# Patient Record
Sex: MALE | Race: WHITE | Employment: FULL TIME | URBAN - METROPOLITAN AREA
[De-identification: names, ages, dates, MRNs, and addresses within clinical notes are randomized per-mention and may not be internally consistent; named-entity substitution may affect disease eponyms.]

---

## 2017-04-04 ENCOUNTER — OFFICE VISIT (OUTPATIENT)
Dept: FAMILY MEDICINE CLINIC | Age: 27
End: 2017-04-04
Payer: COMMERCIAL

## 2017-04-04 VITALS
DIASTOLIC BLOOD PRESSURE: 90 MMHG | SYSTOLIC BLOOD PRESSURE: 120 MMHG | BODY MASS INDEX: 32.28 KG/M2 | WEIGHT: 197 LBS | TEMPERATURE: 98.1 F

## 2017-04-04 DIAGNOSIS — J01.00 ACUTE NON-RECURRENT MAXILLARY SINUSITIS: Primary | ICD-10-CM

## 2017-04-04 PROCEDURE — 99213 OFFICE O/P EST LOW 20 MIN: CPT | Performed by: FAMILY MEDICINE

## 2017-04-04 RX ORDER — AMOXICILLIN AND CLAVULANATE POTASSIUM 875; 125 MG/1; MG/1
1 TABLET, FILM COATED ORAL 2 TIMES DAILY
Qty: 20 TABLET | Refills: 0 | Status: SHIPPED | OUTPATIENT
Start: 2017-04-04 | End: 2017-04-14

## 2017-04-04 ASSESSMENT — ENCOUNTER SYMPTOMS
EYE REDNESS: 0
RHINORRHEA: 1
SORE THROAT: 1
EYE DISCHARGE: 0
COUGH: 1
SHORTNESS OF BREATH: 0

## 2017-08-01 ENCOUNTER — HOSPITAL ENCOUNTER (OUTPATIENT)
Age: 27
Discharge: HOME | End: 2017-08-01
Payer: COMMERCIAL

## 2017-08-01 ENCOUNTER — OFFICE VISIT (OUTPATIENT)
Dept: FAMILY MEDICINE CLINIC | Age: 27
End: 2017-08-01
Payer: COMMERCIAL

## 2017-08-01 VITALS
DIASTOLIC BLOOD PRESSURE: 80 MMHG | HEIGHT: 67 IN | BODY MASS INDEX: 31.08 KG/M2 | WEIGHT: 198 LBS | SYSTOLIC BLOOD PRESSURE: 130 MMHG

## 2017-08-01 DIAGNOSIS — R73.9 HYPERGLYCEMIA: ICD-10-CM

## 2017-08-01 DIAGNOSIS — R73.9 HYPERGLYCEMIA: Primary | ICD-10-CM

## 2017-08-01 LAB
CREATININE URINE: 186.3 MG/DL (ref 39–259)
ESTIMATED AVERAGE GLUCOSE: 255 MG/DL
GLUCOSE FASTING: 241 MG/DL (ref 70–99)
HBA1C MFR BLD: 10.5 % (ref 4.8–5.9)
MICROALBUMIN/CREAT 24H UR: 98 MG/L
MICROALBUMIN/CREAT UR-RTO: 53 MCG/MG CREAT
PATIENT FASTING?: YES

## 2017-08-01 PROCEDURE — 83036 HEMOGLOBIN GLYCOSYLATED A1C: CPT

## 2017-08-01 PROCEDURE — 82570 ASSAY OF URINE CREATININE: CPT

## 2017-08-01 PROCEDURE — 82043 UR ALBUMIN QUANTITATIVE: CPT

## 2017-08-01 PROCEDURE — 36415 COLL VENOUS BLD VENIPUNCTURE: CPT

## 2017-08-01 PROCEDURE — 99213 OFFICE O/P EST LOW 20 MIN: CPT | Performed by: FAMILY MEDICINE

## 2017-08-01 PROCEDURE — 82947 ASSAY GLUCOSE BLOOD QUANT: CPT

## 2017-08-01 ASSESSMENT — ENCOUNTER SYMPTOMS
EYE DISCHARGE: 0
SHORTNESS OF BREATH: 0
FACIAL SWELLING: 0
NAUSEA: 0
VOMITING: 0
COUGH: 0
DIARRHEA: 0
EYE REDNESS: 0

## 2017-09-27 ENCOUNTER — OFFICE VISIT (OUTPATIENT)
Dept: FAMILY MEDICINE CLINIC | Age: 27
End: 2017-09-27
Payer: COMMERCIAL

## 2017-09-27 VITALS
DIASTOLIC BLOOD PRESSURE: 80 MMHG | TEMPERATURE: 98.2 F | SYSTOLIC BLOOD PRESSURE: 132 MMHG | HEART RATE: 88 BPM | OXYGEN SATURATION: 98 % | HEIGHT: 66 IN | WEIGHT: 198 LBS | BODY MASS INDEX: 31.82 KG/M2

## 2017-09-27 DIAGNOSIS — Z02.89 ENCOUNTER FOR PHYSICAL EXAMINATION RELATED TO EMPLOYMENT: Primary | ICD-10-CM

## 2017-09-27 PROCEDURE — 99395 PREV VISIT EST AGE 18-39: CPT | Performed by: NURSE PRACTITIONER

## 2017-09-28 ASSESSMENT — ENCOUNTER SYMPTOMS
DIARRHEA: 0
BLURRED VISION: 0
CONSTIPATION: 0
NAUSEA: 0
SHORTNESS OF BREATH: 0
DOUBLE VISION: 0
BACK PAIN: 0
COUGH: 0
BLOOD IN STOOL: 0
HEARTBURN: 0
ORTHOPNEA: 0
SORE THROAT: 0
VOMITING: 0
ABDOMINAL PAIN: 0

## 2017-10-17 ENCOUNTER — OFFICE VISIT (OUTPATIENT)
Dept: FAMILY MEDICINE CLINIC | Age: 27
End: 2017-10-17
Payer: COMMERCIAL

## 2017-10-17 VITALS
SYSTOLIC BLOOD PRESSURE: 136 MMHG | BODY MASS INDEX: 32.12 KG/M2 | TEMPERATURE: 97.8 F | DIASTOLIC BLOOD PRESSURE: 80 MMHG | WEIGHT: 199 LBS

## 2017-10-17 DIAGNOSIS — L60.0 INGROWN LEFT BIG TOENAIL: Primary | ICD-10-CM

## 2017-10-17 PROCEDURE — 99213 OFFICE O/P EST LOW 20 MIN: CPT | Performed by: FAMILY MEDICINE

## 2017-10-17 RX ORDER — CEPHALEXIN 500 MG/1
500 CAPSULE ORAL 2 TIMES DAILY
Qty: 6 CAPSULE | Refills: 0 | Status: SHIPPED | OUTPATIENT
Start: 2017-10-17 | End: 2018-02-14 | Stop reason: ALTCHOICE

## 2017-10-17 ASSESSMENT — ENCOUNTER SYMPTOMS
VOMITING: 0
NAUSEA: 0
DIARRHEA: 0

## 2017-10-17 NOTE — PATIENT INSTRUCTIONS
SURVEY:    You may be receiving a survey from [employer] regarding your visit today. Please complete the survey to enable us to provide the highest quality of care to you and your family. If you cannot score us a very good on any question, please call the office to discuss how we could of made your experience a very good one. Thank you.

## 2017-10-24 ENCOUNTER — OFFICE VISIT (OUTPATIENT)
Dept: FAMILY MEDICINE CLINIC | Age: 27
End: 2017-10-24
Payer: COMMERCIAL

## 2017-10-24 VITALS — WEIGHT: 197 LBS | BODY MASS INDEX: 31.8 KG/M2 | SYSTOLIC BLOOD PRESSURE: 130 MMHG | DIASTOLIC BLOOD PRESSURE: 60 MMHG

## 2017-10-24 DIAGNOSIS — E11.9 TYPE 2 DIABETES MELLITUS WITHOUT COMPLICATION, WITHOUT LONG-TERM CURRENT USE OF INSULIN (HCC): Primary | ICD-10-CM

## 2017-10-24 LAB — HBA1C MFR BLD: 9 %

## 2017-10-24 PROCEDURE — 99213 OFFICE O/P EST LOW 20 MIN: CPT | Performed by: FAMILY MEDICINE

## 2017-10-24 PROCEDURE — 83036 HEMOGLOBIN GLYCOSYLATED A1C: CPT | Performed by: FAMILY MEDICINE

## 2017-10-24 RX ORDER — GLUCOSAMINE HCL/CHONDROITIN SU 500-400 MG
CAPSULE ORAL
Qty: 100 STRIP | Refills: 1 | Status: SHIPPED | OUTPATIENT
Start: 2017-10-24

## 2017-10-24 RX ORDER — DIPHENHYDRAMINE HYDROCHLORIDE 25 MG/1
CAPSULE, LIQUID FILLED ORAL
Qty: 1 KIT | Refills: 0 | Status: SHIPPED | OUTPATIENT
Start: 2017-10-24

## 2017-10-24 RX ORDER — LISINOPRIL 5 MG/1
5 TABLET ORAL DAILY
Qty: 30 TABLET | Refills: 3 | Status: SHIPPED | OUTPATIENT
Start: 2017-10-24 | End: 2018-02-08 | Stop reason: SDUPTHER

## 2017-10-24 RX ORDER — LANCETS 30 GAUGE
EACH MISCELLANEOUS
Qty: 100 EACH | Refills: 3 | Status: SHIPPED | OUTPATIENT
Start: 2017-10-24

## 2017-10-24 ASSESSMENT — ENCOUNTER SYMPTOMS
EYE DISCHARGE: 0
EYE REDNESS: 0
DIARRHEA: 0
TROUBLE SWALLOWING: 0
COUGH: 0
FACIAL SWELLING: 0
VOMITING: 0
SHORTNESS OF BREATH: 0
NAUSEA: 0
ABDOMINAL PAIN: 0

## 2017-10-24 NOTE — PROGRESS NOTES
Neck: Neck supple. No thyromegaly present. Cardiovascular: Normal rate, regular rhythm and normal heart sounds. No murmur heard. Pulmonary/Chest: Effort normal and breath sounds normal. No respiratory distress. He has no wheezes. Abdominal: Soft. Bowel sounds are normal. He exhibits no distension. There is no tenderness. Musculoskeletal: He exhibits no edema. Lymphadenopathy:     He has no cervical adenopathy. Neurological: He is alert and oriented to person, place, and time. Skin: No rash noted. No erythema. Gross sensation intact and no lesions or sores on feet bilateral. +2 DP pulse bilateral.     Psychiatric: He has a normal mood and affect. Vitals reviewed. Vitals:  /60   Wt 197 lb (89.4 kg)   BMI 31.80 kg/m²       Data:     Lab Results   Component Value Date    GLUCOSE 265 10/20/2016     No results found for: WBC, RBC, HGB, HCT, MCV, MCH, MCHC, RDW, PLT, MPV  No results found for: TSH  Lab Results   Component Value Date    CHOL 122 10/20/2016    HDL 27 10/20/2016    LABA1C 9.0 10/24/2017          Assessment/Plan:       1. Type 2 diabetes mellitus without complication, without long-term current use of insulin ([facility] [address]  Had long d/w pt about DM diet --- low carb and sugars, drink more water and exercise at least 3d per week. Pt started on Januvia 50mg daily and glucophage 500mg BID and lisinopril 5mg daily. Pt to have labs in 3mos before f/u  All questions answered  - POCT glycosylated hemoglobin (Hb A1C)  - Lipid Panel; Future  - Hemoglobin A1C; Future  - Comprehensive Metabolic Panel;  Future        Electronically signed by Lencho Chung MD on 10/24/2017 at 11:31 AM

## 2017-10-25 ENCOUNTER — TELEPHONE (OUTPATIENT)
Dept: FAMILY MEDICINE CLINIC | Age: 27
End: 2017-10-25

## 2017-10-25 RX ORDER — GLIPIZIDE 5 MG/1
5 TABLET ORAL 2 TIMES DAILY
Qty: 60 TABLET | Refills: 2 | Status: SHIPPED | OUTPATIENT
Start: 2017-10-25 | End: 2018-02-08 | Stop reason: SDUPTHER

## 2017-10-25 NOTE — TELEPHONE ENCOUNTER
Patient called in stating Radha Jiang is over 400 dollars and he does not want to pay this    Anything else we can send for him    Health Maintenance   Topic Date Due    Diabetic foot exam  09/26/1998    Diabetic retinal exam  09/26/1998    HIV screen  09/26/2003    DTaP/Tdap/Td vaccine (1 - Tdap) 09/26/2007    Pneumococcal med risk (1 of 1 - PPSV23) 09/26/2007    Flu vaccine (1) 09/01/2017    Lipid screen  10/20/2017    Diabetic hemoglobin A1C test  01/24/2018    Diabetic microalbuminuria test  08/01/2018             (applicable per patient's age: Cancer Screenings, Depression Screening, Fall Risk Screening, Immunizations)    Hemoglobin A1C (%)   Date Value   10/24/2017 9.0   08/01/2017 10.5 (H)     Microalb/Crt. Ratio (mcg/mg creat)   Date Value   08/01/2017 53 (H)     LDL Cholesterol (mg/dL)   Date Value   10/20/2016 51      (goal A1C is < 7)   (goal LDL is <100) need 30-50% reduction from baseline     BP Readings from Last 3 Encounters:   10/24/17 130/60   10/17/17 136/80   09/27/17 132/80    (goal /80)      All Future Testing planned in [facility]:  Lab Frequency Next Occurrence   Lipid Panel Once 01/24/2018   Hemoglobin A1C Once 01/24/2018   Comprehensive Metabolic Panel Once 54/76/4678       Next Visit Date:  No future appointments.          Patient Active Problem List:     Attention deficit disorder

## 2018-02-08 RX ORDER — GLIPIZIDE 5 MG/1
5 TABLET ORAL 2 TIMES DAILY
Qty: 60 TABLET | Refills: 1 | Status: SHIPPED | OUTPATIENT
Start: 2018-02-08 | End: 2018-03-29 | Stop reason: SDUPTHER

## 2018-02-08 RX ORDER — LISINOPRIL 5 MG/1
5 TABLET ORAL DAILY
Qty: 30 TABLET | Refills: 1 | Status: SHIPPED | OUTPATIENT
Start: 2018-02-08 | End: 2018-03-29 | Stop reason: SDUPTHER

## 2018-02-08 NOTE — TELEPHONE ENCOUNTER
Requesting a refill on Lisinopril 5 mg, Metformin 500 mg and Glipizide. Patient states he will call back to schedule an appointment. He was last seen 10/24/17. Drug [address] Maintenance   Topic Date Due    Potassium monitoring  1990    Creatinine monitoring  1990    Diabetic foot exam  09/26/1998    Diabetic retinal exam  09/26/1998    HIV screen  09/26/2003    DTaP/Tdap/Td vaccine (1 - Tdap) 09/26/2007    Pneumococcal med risk (1 of 1 - PPSV23) 09/26/2007    Flu vaccine (1) 09/01/2017    Lipid screen  10/20/2017    A1C test (Diabetic or Prediabetic)  01/24/2018    Diabetic microalbuminuria test  08/01/2018             (applicable per patient's age: Cancer Screenings, Depression Screening, Fall Risk Screening, Immunizations)    Hemoglobin A1C (%)   Date Value   10/24/2017 9.0   08/01/2017 10.5 (H)     Microalb/Crt. Ratio (mcg/mg creat)   Date Value   08/01/2017 53 (H)     LDL Cholesterol (mg/dL)   Date Value   10/20/2016 51      (goal A1C is < 7)   (goal LDL is <100) need 30-50% reduction from baseline     BP Readings from Last 3 Encounters:   10/24/17 130/60   10/17/17 136/80   09/27/17 132/80    (goal /80)      All Future Testing planned in [facility]:  Lab Frequency Next Occurrence   Lipid Panel Once 10/23/2018   Hemoglobin A1C Once 10/23/2018   Comprehensive Metabolic Panel Once 67/38/7650       Next Visit Date:  No future appointments.          Patient Active Problem List:     Attention deficit disorder

## 2018-02-13 ENCOUNTER — HOSPITAL ENCOUNTER (OUTPATIENT)
Age: 28
Discharge: HOME | End: 2018-02-13
Payer: COMMERCIAL

## 2018-02-13 DIAGNOSIS — E11.9 TYPE 2 DIABETES MELLITUS WITHOUT COMPLICATION, WITHOUT LONG-TERM CURRENT USE OF INSULIN (HCC): ICD-10-CM

## 2018-02-13 LAB
ALBUMIN SERPL-MCNC: 4.6 G/DL (ref 3.5–5.2)
ALBUMIN/GLOBULIN RATIO: ABNORMAL (ref 1–2.5)
ALP BLD-CCNC: 101 U/L (ref 40–129)
ALT SERPL-CCNC: 84 U/L (ref 5–41)
ANION GAP SERPL CALCULATED.3IONS-SCNC: 13 MMOL/L (ref 9–17)
AST SERPL-CCNC: 39 U/L
BILIRUB SERPL-MCNC: 0.31 MG/DL (ref 0.3–1.2)
BUN BLDV-MCNC: 13 MG/DL (ref 6–20)
BUN/CREAT BLD: 13 (ref 9–20)
CALCIUM SERPL-MCNC: 9.8 MG/DL (ref 8.6–10.4)
CHLORIDE BLD-SCNC: 102 MMOL/L (ref 98–107)
CHOLESTEROL/HDL RATIO: 6.5
CHOLESTEROL: 144 MG/DL
CO2: 26 MMOL/L (ref 20–31)
CREAT SERPL-MCNC: 0.99 MG/DL (ref 0.7–1.2)
ESTIMATED AVERAGE GLUCOSE: 123 MG/DL
GFR AFRICAN AMERICAN: >60 ML/MIN
GFR NON-AFRICAN AMERICAN: >60 ML/MIN
GFR SERPL CREATININE-BSD FRML MDRD: ABNORMAL ML/MIN/{1.73_M2}
GFR SERPL CREATININE-BSD FRML MDRD: ABNORMAL ML/MIN/{1.73_M2}
GLUCOSE BLD-MCNC: 104 MG/DL (ref 70–99)
HBA1C MFR BLD: 5.9 % (ref 4.8–5.9)
HDLC SERPL-MCNC: 22 MG/DL
LDL CHOLESTEROL: 54 MG/DL (ref 0–130)
PATIENT FASTING?: YES
POTASSIUM SERPL-SCNC: 4 MMOL/L (ref 3.7–5.3)
SODIUM BLD-SCNC: 141 MMOL/L (ref 135–144)
TOTAL PROTEIN: 8.3 G/DL (ref 6.4–8.3)
TRIGL SERPL-MCNC: 339 MG/DL
VLDLC SERPL CALC-MCNC: ABNORMAL MG/DL (ref 1–30)

## 2018-02-13 PROCEDURE — 83036 HEMOGLOBIN GLYCOSYLATED A1C: CPT

## 2018-02-13 PROCEDURE — 80053 COMPREHEN METABOLIC PANEL: CPT

## 2018-02-13 PROCEDURE — 36415 COLL VENOUS BLD VENIPUNCTURE: CPT

## 2018-02-13 PROCEDURE — 80061 LIPID PANEL: CPT

## 2018-02-14 ENCOUNTER — OFFICE VISIT (OUTPATIENT)
Dept: FAMILY MEDICINE CLINIC | Age: 28
End: 2018-02-14
Payer: COMMERCIAL

## 2018-02-14 VITALS
TEMPERATURE: 99 F | BODY MASS INDEX: 31.96 KG/M2 | HEART RATE: 72 BPM | OXYGEN SATURATION: 98 % | DIASTOLIC BLOOD PRESSURE: 80 MMHG | SYSTOLIC BLOOD PRESSURE: 138 MMHG | WEIGHT: 198 LBS

## 2018-02-14 DIAGNOSIS — J20.8 ACUTE BRONCHITIS DUE TO OTHER SPECIFIED ORGANISMS: ICD-10-CM

## 2018-02-14 DIAGNOSIS — E11.9 TYPE 2 DIABETES MELLITUS WITHOUT COMPLICATION, WITHOUT LONG-TERM CURRENT USE OF INSULIN (HCC): Primary | ICD-10-CM

## 2018-02-14 PROCEDURE — 99213 OFFICE O/P EST LOW 20 MIN: CPT | Performed by: FAMILY MEDICINE

## 2018-02-14 RX ORDER — AZITHROMYCIN 250 MG/1
TABLET, FILM COATED ORAL
Qty: 1 PACKET | Refills: 0 | Status: SHIPPED | OUTPATIENT
Start: 2018-02-14 | End: 2018-08-06 | Stop reason: ALTCHOICE

## 2018-02-14 ASSESSMENT — PATIENT HEALTH QUESTIONNAIRE - PHQ9
2. FEELING DOWN, DEPRESSED OR HOPELESS: 0
1. LITTLE INTEREST OR PLEASURE IN DOING THINGS: 0
SUM OF ALL RESPONSES TO PHQ9 QUESTIONS 1 & 2: 0
SUM OF ALL RESPONSES TO PHQ QUESTIONS 1-9: 0

## 2018-02-14 ASSESSMENT — ENCOUNTER SYMPTOMS
WHEEZING: 1
SHORTNESS OF BREATH: 0
DIARRHEA: 0
HEMOPTYSIS: 0
EYE DISCHARGE: 0
CONSTIPATION: 0
COUGH: 1
EYE REDNESS: 0
SORE THROAT: 0
VOMITING: 0
ABDOMINAL PAIN: 0
HEARTBURN: 0
RHINORRHEA: 0

## 2018-02-14 NOTE — PATIENT INSTRUCTIONS
SURVEY:    You may be receiving a survey from [employer] regarding your visit today. Please complete the survey to enable us to provide the highest quality of care to you and your family. If you cannot score us a very good on any question, please call the office to discuss how we could of made your experience a very good one. Thank you. DATE: MEDICATIONS TAKEN TODAY? BLOOD PRESSURE READING: HEART RATE/PULSE: BLOOD SUGAR #/FASTING?  COMMENTS

## 2018-03-29 ENCOUNTER — TELEPHONE (OUTPATIENT)
Dept: FAMILY MEDICINE CLINIC | Age: 28
End: 2018-03-29

## 2018-03-31 RX ORDER — LISINOPRIL 5 MG/1
5 TABLET ORAL DAILY
Qty: 30 TABLET | Refills: 0 | Status: SHIPPED | OUTPATIENT
Start: 2018-03-31 | End: 2018-07-24 | Stop reason: SDUPTHER

## 2018-03-31 RX ORDER — GLIPIZIDE 5 MG/1
5 TABLET ORAL 2 TIMES DAILY
Qty: 60 TABLET | Refills: 2 | Status: SHIPPED | OUTPATIENT
Start: 2018-03-31 | End: 2018-07-24 | Stop reason: SDUPTHER

## 2018-07-25 RX ORDER — LISINOPRIL 5 MG/1
5 TABLET ORAL DAILY
Qty: 30 TABLET | Refills: 0 | Status: SHIPPED | OUTPATIENT
Start: 2018-07-25 | End: 2018-08-06 | Stop reason: SDUPTHER

## 2018-07-25 RX ORDER — GLIPIZIDE 5 MG/1
5 TABLET ORAL 2 TIMES DAILY
Qty: 60 TABLET | Refills: 0 | Status: SHIPPED | OUTPATIENT
Start: 2018-07-25 | End: 2018-08-06 | Stop reason: SDUPTHER

## 2018-08-03 ENCOUNTER — HOSPITAL ENCOUNTER (OUTPATIENT)
Age: 28
Discharge: HOME | End: 2018-08-03
Payer: COMMERCIAL

## 2018-08-03 DIAGNOSIS — E11.9 TYPE 2 DIABETES MELLITUS WITHOUT COMPLICATION, WITHOUT LONG-TERM CURRENT USE OF INSULIN (HCC): ICD-10-CM

## 2018-08-03 LAB
ALBUMIN SERPL-MCNC: 4.5 G/DL (ref 3.5–5.2)
ALBUMIN/GLOBULIN RATIO: ABNORMAL (ref 1–2.5)
ALP BLD-CCNC: 109 U/L (ref 40–129)
ALT SERPL-CCNC: 129 U/L (ref 5–41)
ANION GAP SERPL CALCULATED.3IONS-SCNC: 10 MMOL/L (ref 9–17)
AST SERPL-CCNC: 45 U/L
BILIRUB SERPL-MCNC: 0.82 MG/DL (ref 0.3–1.2)
BUN BLDV-MCNC: 12 MG/DL (ref 6–20)
BUN/CREAT BLD: 12 (ref 9–20)
CALCIUM SERPL-MCNC: 10 MG/DL (ref 8.6–10.4)
CHLORIDE BLD-SCNC: 100 MMOL/L (ref 98–107)
CHOLESTEROL/HDL RATIO: 4.7
CHOLESTEROL: 136 MG/DL
CO2: 27 MMOL/L (ref 20–31)
CREAT SERPL-MCNC: 1.01 MG/DL (ref 0.7–1.2)
CREATININE URINE: 285.9 MG/DL (ref 39–259)
ESTIMATED AVERAGE GLUCOSE: 151 MG/DL
GFR AFRICAN AMERICAN: >60 ML/MIN
GFR NON-AFRICAN AMERICAN: >60 ML/MIN
GFR SERPL CREATININE-BSD FRML MDRD: ABNORMAL ML/MIN/{1.73_M2}
GFR SERPL CREATININE-BSD FRML MDRD: ABNORMAL ML/MIN/{1.73_M2}
GLUCOSE BLD-MCNC: 176 MG/DL (ref 70–99)
HBA1C MFR BLD: 6.9 % (ref 4.8–5.9)
HDLC SERPL-MCNC: 29 MG/DL
LDL CHOLESTEROL: 61 MG/DL (ref 0–130)
MICROALBUMIN/CREAT 24H UR: 235 MG/L
MICROALBUMIN/CREAT UR-RTO: 82 MCG/MG CREAT
PATIENT FASTING?: YES
POTASSIUM SERPL-SCNC: 4.4 MMOL/L (ref 3.7–5.3)
SODIUM BLD-SCNC: 137 MMOL/L (ref 135–144)
TOTAL PROTEIN: 7.8 G/DL (ref 6.4–8.3)
TRIGL SERPL-MCNC: 231 MG/DL
VLDLC SERPL CALC-MCNC: ABNORMAL MG/DL (ref 1–30)

## 2018-08-03 PROCEDURE — 82043 UR ALBUMIN QUANTITATIVE: CPT

## 2018-08-03 PROCEDURE — 36415 COLL VENOUS BLD VENIPUNCTURE: CPT

## 2018-08-03 PROCEDURE — 83036 HEMOGLOBIN GLYCOSYLATED A1C: CPT

## 2018-08-03 PROCEDURE — 82570 ASSAY OF URINE CREATININE: CPT

## 2018-08-03 PROCEDURE — 80053 COMPREHEN METABOLIC PANEL: CPT

## 2018-08-03 PROCEDURE — 80061 LIPID PANEL: CPT

## 2018-08-06 ENCOUNTER — OFFICE VISIT (OUTPATIENT)
Dept: FAMILY MEDICINE CLINIC | Age: 28
End: 2018-08-06
Payer: COMMERCIAL

## 2018-08-06 VITALS
BODY MASS INDEX: 32.77 KG/M2 | WEIGHT: 203 LBS | DIASTOLIC BLOOD PRESSURE: 80 MMHG | OXYGEN SATURATION: 98 % | SYSTOLIC BLOOD PRESSURE: 136 MMHG | HEART RATE: 94 BPM

## 2018-08-06 DIAGNOSIS — E11.69 TYPE 2 DIABETES MELLITUS WITH OTHER SPECIFIED COMPLICATION, WITHOUT LONG-TERM CURRENT USE OF INSULIN (HCC): Primary | ICD-10-CM

## 2018-08-06 DIAGNOSIS — R79.89 ELEVATED LFTS: ICD-10-CM

## 2018-08-06 PROBLEM — E11.9 DIABETES MELLITUS (HCC): Status: ACTIVE | Noted: 2018-08-06

## 2018-08-06 PROCEDURE — 99213 OFFICE O/P EST LOW 20 MIN: CPT | Performed by: FAMILY MEDICINE

## 2018-08-06 RX ORDER — LISINOPRIL 5 MG/1
5 TABLET ORAL DAILY
Qty: 30 TABLET | Refills: 5 | Status: SHIPPED | OUTPATIENT
Start: 2018-08-06 | End: 2018-12-03 | Stop reason: SDUPTHER

## 2018-08-06 RX ORDER — GLIPIZIDE 5 MG/1
5 TABLET ORAL 2 TIMES DAILY
Qty: 60 TABLET | Refills: 5 | Status: SHIPPED | OUTPATIENT
Start: 2018-08-06 | End: 2018-12-03 | Stop reason: SDUPTHER

## 2018-08-06 ASSESSMENT — ENCOUNTER SYMPTOMS
SHORTNESS OF BREATH: 0
DIARRHEA: 0
EYE DISCHARGE: 0
COUGH: 0
FACIAL SWELLING: 0
ABDOMINAL PAIN: 0
VOMITING: 0
EYE REDNESS: 0
NAUSEA: 0

## 2018-11-30 ENCOUNTER — HOSPITAL ENCOUNTER (OUTPATIENT)
Age: 28
Discharge: HOME | End: 2018-11-30
Payer: COMMERCIAL

## 2018-11-30 DIAGNOSIS — E11.69 TYPE 2 DIABETES MELLITUS WITH OTHER SPECIFIED COMPLICATION, WITHOUT LONG-TERM CURRENT USE OF INSULIN (HCC): ICD-10-CM

## 2018-11-30 DIAGNOSIS — R79.89 ELEVATED LFTS: ICD-10-CM

## 2018-11-30 LAB
ALBUMIN SERPL-MCNC: 4.8 G/DL (ref 3.5–5.2)
ALBUMIN/GLOBULIN RATIO: ABNORMAL (ref 1–2.5)
ALP BLD-CCNC: 83 U/L (ref 40–129)
ALT SERPL-CCNC: 120 U/L (ref 5–41)
AST SERPL-CCNC: 42 U/L
BILIRUB SERPL-MCNC: 0.54 MG/DL (ref 0.3–1.2)
BILIRUBIN DIRECT: <0.08 MG/DL
BILIRUBIN, INDIRECT: ABNORMAL MG/DL (ref 0–1)
ESTIMATED AVERAGE GLUCOSE: 123 MG/DL
GLOBULIN: ABNORMAL G/DL (ref 1.5–3.8)
HBA1C MFR BLD: 5.9 % (ref 4.8–5.9)
HEPATITIS C ANTIBODY: NONREACTIVE
TOTAL PROTEIN: 7.8 G/DL (ref 6.4–8.3)

## 2018-11-30 PROCEDURE — 83036 HEMOGLOBIN GLYCOSYLATED A1C: CPT

## 2018-11-30 PROCEDURE — 80076 HEPATIC FUNCTION PANEL: CPT

## 2018-11-30 PROCEDURE — 36415 COLL VENOUS BLD VENIPUNCTURE: CPT

## 2018-11-30 PROCEDURE — 86803 HEPATITIS C AB TEST: CPT

## 2018-12-03 ENCOUNTER — OFFICE VISIT (OUTPATIENT)
Dept: FAMILY MEDICINE CLINIC | Age: 28
End: 2018-12-03
Payer: COMMERCIAL

## 2018-12-03 VITALS
BODY MASS INDEX: 32.62 KG/M2 | HEIGHT: 66 IN | TEMPERATURE: 99.2 F | SYSTOLIC BLOOD PRESSURE: 134 MMHG | OXYGEN SATURATION: 99 % | HEART RATE: 101 BPM | DIASTOLIC BLOOD PRESSURE: 82 MMHG | WEIGHT: 203 LBS

## 2018-12-03 DIAGNOSIS — E11.69 TYPE 2 DIABETES MELLITUS WITH OTHER SPECIFIED COMPLICATION, WITHOUT LONG-TERM CURRENT USE OF INSULIN (HCC): ICD-10-CM

## 2018-12-03 DIAGNOSIS — R74.8 ELEVATED LIVER ENZYMES: Primary | ICD-10-CM

## 2018-12-03 PROCEDURE — 99213 OFFICE O/P EST LOW 20 MIN: CPT | Performed by: NURSE PRACTITIONER

## 2018-12-03 RX ORDER — GLIPIZIDE 5 MG/1
5 TABLET ORAL 2 TIMES DAILY
Qty: 180 TABLET | Refills: 1 | Status: SHIPPED | OUTPATIENT
Start: 2018-12-03 | End: 2019-08-29 | Stop reason: SDUPTHER

## 2018-12-03 RX ORDER — LISINOPRIL 5 MG/1
5 TABLET ORAL DAILY
Qty: 90 TABLET | Refills: 1 | Status: SHIPPED | OUTPATIENT
Start: 2018-12-03 | End: 2019-08-29 | Stop reason: SDUPTHER

## 2018-12-03 ASSESSMENT — PATIENT HEALTH QUESTIONNAIRE - PHQ9
1. LITTLE INTEREST OR PLEASURE IN DOING THINGS: 0
SUM OF ALL RESPONSES TO PHQ9 QUESTIONS 1 & 2: 0
SUM OF ALL RESPONSES TO PHQ QUESTIONS 1-9: 0
2. FEELING DOWN, DEPRESSED OR HOPELESS: 0
SUM OF ALL RESPONSES TO PHQ QUESTIONS 1-9: 0

## 2018-12-03 NOTE — PROGRESS NOTES
MISC Test sugar bid 100 each 3     No current facility-administered medications for this visit. No Known Allergies    Health Maintenance   Topic Date Due    Diabetic retinal exam  09/26/1998    HIV screen  09/26/2003    DTaP/Tdap/Td vaccine (1 - Tdap) 09/26/2007    Pneumococcal med risk (1 of 1 - PPSV23) 09/26/2007    Flu vaccine (1) 09/01/2018    Diabetic foot exam  02/14/2019    Diabetic microalbuminuria test  08/03/2019    Lipid screen  08/03/2019    Potassium monitoring  08/03/2019    Creatinine monitoring  08/03/2019    A1C test (Diabetic or Prediabetic)  11/30/2019       Subjective:      Review of Systems    Objective:     Physical Exam   Constitutional: He is oriented to person, place, and time. He appears well-developed and well-nourished. No distress. HENT:   Head: Normocephalic and atraumatic. Left Ear: External ear normal.   Mouth/Throat: Oropharynx is clear and moist. No oropharyngeal exudate. Eyes: Pupils are equal, round, and reactive to light. Neck: Normal range of motion. Neck supple. Cardiovascular: Normal rate, regular rhythm, normal heart sounds and normal pulses. No murmur heard. Pulmonary/Chest: Effort normal and breath sounds normal. No respiratory distress. Abdominal: Soft. He exhibits no mass. There is no tenderness. Musculoskeletal: Normal range of motion. Lymphadenopathy:     He has no cervical adenopathy. Neurological: He is alert and oriented to person, place, and time. Skin: No rash noted. Psychiatric: He has a normal mood and affect. His behavior is normal. Judgment and thought content normal.   Good eye contact. Nursing note and vitals reviewed.     /82   Pulse 101   Temp 99.2 °F (37.3 °C)   Ht 5' 6\" (1.676 m)   Wt 203 lb (92.1 kg)   SpO2 99%   BMI 32.77 kg/m²     Data:     Lab Results   Component Value Date     08/03/2018    K 4.4 08/03/2018     08/03/2018    CO2 27 08/03/2018    BUN 12 08/03/2018    CREATININE 1.01 Refill:  1    glipiZIDE (GLUCOTROL) 5 MG tablet     Sig: Take 1 tablet by mouth 2 times daily     Dispense:  180 tablet     Refill:  1     Orders Placed This Encounter   Procedures    US LIVER     Standing Status:   Future     Standing Expiration Date:   12/3/2019     Order Specific Question:   Reason for exam:     Answer:   elevated liver enzymes suspect fatty liver.  Microalbumin, Ur     Standing Status:   Future     Standing Expiration Date:   12/3/2019         Isabel Mcgregor received counseling on the following healthy behaviors: nutrition, exercise and medication adherence  Reviewed prior labs and health maintenance. Continue current medications, diet and exercise. Discussed use, benefit, and side effects of prescribed medications. Barriers to medication compliance addressed. Patient given educational materials - see patient instructions. All patient questions answered. Patient voiced understanding.           Electronically signed by CINDY Vernon CNP on 12/6/2018 at 11:34 PM

## 2019-03-28 ENCOUNTER — OFFICE VISIT (OUTPATIENT)
Dept: FAMILY MEDICINE CLINIC | Age: 29
End: 2019-03-28
Payer: COMMERCIAL

## 2019-03-28 VITALS
DIASTOLIC BLOOD PRESSURE: 80 MMHG | BODY MASS INDEX: 33.25 KG/M2 | OXYGEN SATURATION: 98 % | WEIGHT: 206 LBS | SYSTOLIC BLOOD PRESSURE: 138 MMHG | HEART RATE: 80 BPM

## 2019-03-28 DIAGNOSIS — R79.89 ELEVATED LFTS: ICD-10-CM

## 2019-03-28 DIAGNOSIS — E11.69 TYPE 2 DIABETES MELLITUS WITH OTHER SPECIFIED COMPLICATION, WITHOUT LONG-TERM CURRENT USE OF INSULIN (HCC): Primary | ICD-10-CM

## 2019-03-28 LAB — HBA1C MFR BLD: 7.4 %

## 2019-03-28 PROCEDURE — 99213 OFFICE O/P EST LOW 20 MIN: CPT | Performed by: FAMILY MEDICINE

## 2019-03-28 PROCEDURE — 83036 HEMOGLOBIN GLYCOSYLATED A1C: CPT | Performed by: FAMILY MEDICINE

## 2019-03-28 ASSESSMENT — PATIENT HEALTH QUESTIONNAIRE - PHQ9
SUM OF ALL RESPONSES TO PHQ QUESTIONS 1-9: 0
1. LITTLE INTEREST OR PLEASURE IN DOING THINGS: 0
2. FEELING DOWN, DEPRESSED OR HOPELESS: 0
SUM OF ALL RESPONSES TO PHQ QUESTIONS 1-9: 0
SUM OF ALL RESPONSES TO PHQ9 QUESTIONS 1 & 2: 0

## 2019-03-29 ASSESSMENT — ENCOUNTER SYMPTOMS
EYE REDNESS: 0
TROUBLE SWALLOWING: 0
EYE DISCHARGE: 0
ABDOMINAL PAIN: 0
VOMITING: 0
NAUSEA: 0
FACIAL SWELLING: 0
CONSTIPATION: 0
COUGH: 0
DIARRHEA: 0
SHORTNESS OF BREATH: 0
BLOOD IN STOOL: 0

## 2019-08-21 ENCOUNTER — HOSPITAL ENCOUNTER (OUTPATIENT)
Age: 29
Discharge: HOME | End: 2019-08-21
Payer: COMMERCIAL

## 2019-08-21 DIAGNOSIS — E11.69 TYPE 2 DIABETES MELLITUS WITH OTHER SPECIFIED COMPLICATION, WITHOUT LONG-TERM CURRENT USE OF INSULIN (HCC): ICD-10-CM

## 2019-08-21 LAB
ALBUMIN SERPL-MCNC: 5 G/DL (ref 3.5–5.2)
ALBUMIN/GLOBULIN RATIO: ABNORMAL (ref 1–2.5)
ALP BLD-CCNC: 93 U/L (ref 40–129)
ALT SERPL-CCNC: 125 U/L (ref 5–41)
ANION GAP SERPL CALCULATED.3IONS-SCNC: 12 MMOL/L (ref 9–17)
AST SERPL-CCNC: 44 U/L
BILIRUB SERPL-MCNC: 0.56 MG/DL (ref 0.3–1.2)
BUN BLDV-MCNC: 12 MG/DL (ref 6–20)
BUN/CREAT BLD: 12 (ref 9–20)
CALCIUM SERPL-MCNC: 11 MG/DL (ref 8.6–10.4)
CHLORIDE BLD-SCNC: 102 MMOL/L (ref 98–107)
CHOLESTEROL/HDL RATIO: 5.1
CHOLESTEROL: 149 MG/DL
CO2: 27 MMOL/L (ref 20–31)
CREAT SERPL-MCNC: 0.98 MG/DL (ref 0.7–1.2)
CREATININE URINE: 265.5 MG/DL (ref 39–259)
ESTIMATED AVERAGE GLUCOSE: 160 MG/DL
GFR AFRICAN AMERICAN: >60 ML/MIN
GFR NON-AFRICAN AMERICAN: >60 ML/MIN
GFR SERPL CREATININE-BSD FRML MDRD: ABNORMAL ML/MIN/{1.73_M2}
GFR SERPL CREATININE-BSD FRML MDRD: ABNORMAL ML/MIN/{1.73_M2}
GLUCOSE BLD-MCNC: 139 MG/DL (ref 70–99)
HBA1C MFR BLD: 7.2 % (ref 4.8–5.9)
HDLC SERPL-MCNC: 29 MG/DL
LDL CHOLESTEROL: 69 MG/DL (ref 0–130)
MICROALBUMIN/CREAT 24H UR: 387 MG/L
MICROALBUMIN/CREAT UR-RTO: 146 MCG/MG CREAT
PATIENT FASTING?: YES
POTASSIUM SERPL-SCNC: 4.1 MMOL/L (ref 3.7–5.3)
SODIUM BLD-SCNC: 141 MMOL/L (ref 135–144)
TOTAL PROTEIN: 8.6 G/DL (ref 6.4–8.3)
TRIGL SERPL-MCNC: 257 MG/DL
VLDLC SERPL CALC-MCNC: ABNORMAL MG/DL (ref 1–30)

## 2019-08-21 PROCEDURE — 82043 UR ALBUMIN QUANTITATIVE: CPT

## 2019-08-21 PROCEDURE — 80061 LIPID PANEL: CPT

## 2019-08-21 PROCEDURE — 83036 HEMOGLOBIN GLYCOSYLATED A1C: CPT

## 2019-08-21 PROCEDURE — 82570 ASSAY OF URINE CREATININE: CPT

## 2019-08-21 PROCEDURE — 36415 COLL VENOUS BLD VENIPUNCTURE: CPT

## 2019-08-21 PROCEDURE — 80053 COMPREHEN METABOLIC PANEL: CPT

## 2019-08-29 ENCOUNTER — OFFICE VISIT (OUTPATIENT)
Dept: FAMILY MEDICINE CLINIC | Age: 29
End: 2019-08-29
Payer: COMMERCIAL

## 2019-08-29 VITALS
OXYGEN SATURATION: 98 % | WEIGHT: 204 LBS | DIASTOLIC BLOOD PRESSURE: 70 MMHG | SYSTOLIC BLOOD PRESSURE: 134 MMHG | BODY MASS INDEX: 32.93 KG/M2 | HEART RATE: 102 BPM

## 2019-08-29 DIAGNOSIS — E11.69 TYPE 2 DIABETES MELLITUS WITH OTHER SPECIFIED COMPLICATION, WITHOUT LONG-TERM CURRENT USE OF INSULIN (HCC): Primary | ICD-10-CM

## 2019-08-29 DIAGNOSIS — R79.89 ELEVATED LFTS: ICD-10-CM

## 2019-08-29 PROCEDURE — 99213 OFFICE O/P EST LOW 20 MIN: CPT | Performed by: FAMILY MEDICINE

## 2019-08-29 RX ORDER — GLIPIZIDE 5 MG/1
5 TABLET ORAL 2 TIMES DAILY
Qty: 180 TABLET | Refills: 1 | Status: SHIPPED | OUTPATIENT
Start: 2019-08-29 | End: 2020-03-24

## 2019-08-29 RX ORDER — LISINOPRIL 10 MG/1
10 TABLET ORAL DAILY
Qty: 90 TABLET | Refills: 1 | Status: SHIPPED | OUTPATIENT
Start: 2019-08-29 | End: 2020-04-21 | Stop reason: SDUPTHER

## 2019-08-29 ASSESSMENT — ENCOUNTER SYMPTOMS
SHORTNESS OF BREATH: 0
ABDOMINAL PAIN: 0
DIARRHEA: 0
TROUBLE SWALLOWING: 0
NAUSEA: 0
BLOOD IN STOOL: 0
COUGH: 0
EYE REDNESS: 0
CONSTIPATION: 0
EYE DISCHARGE: 0
VOMITING: 0

## 2019-08-29 NOTE — PROGRESS NOTES
Vaccine (1 of 1 - PPSV23) 09/26/1994    Diabetic retinal exam  09/26/1998    Varicella Vaccine (1 of 2 - 13+ 2-dose series) 09/26/2001    HIV screen  09/26/2003    Hepatitis B Vaccine (1 of 3 - Risk 3-dose series) 09/26/2007    DTaP/Tdap/Td vaccine (1 - Tdap) 09/26/2007    Diabetic foot exam  02/14/2019       Past Surgical History:     History reviewed. No pertinent surgical history. Medications:       Prior to Admission medications    Medication Sig Start Date End Date Taking? Authorizing Provider   glipiZIDE (GLUCOTROL) 5 MG tablet Take 1 tablet by mouth 2 times daily 8/29/19  Yes Mariajose Gary MD   lisinopril (PRINIVIL;ZESTRIL) 10 MG tablet Take 1 tablet by mouth daily 8/29/19  Yes Mariajose Gary MD   metFORMIN (GLUCOPHAGE) 500 MG tablet Take 1 tablet by mouth 2 times daily (with meals) 8/29/19  Yes Mariajose Gary MD   Blood Glucose Monitoring Suppl (BLOOD GLUCOSE MONITOR SYSTEM) w/Device KIT Test sugar BID 10/24/17   Mariajose Gary MD   Glucose Blood (BLOOD GLUCOSE TEST STRIPS) STRP Test sugar BID 10/24/17   Mariajose Gary MD   Lancets MISC Test sugar bid 10/24/17   Mariajose Gary MD        Allergies:       Patient has no known allergies. Social History:     Tobacco:    reports that he has never smoked. He has never used smokeless tobacco.  Alcohol:      reports that he does not drink alcohol. Drug Use:  reports that he does not use drugs. Family History:     Family History   Problem Relation Age of Onset    Diabetes Father     Diabetes Maternal Grandmother     High Cholesterol Maternal Grandmother        Review of Systems:       Review of Systems   Constitutional: Negative for chills, fatigue and fever. HENT: Negative for congestion and trouble swallowing. Eyes: Negative for discharge, redness and visual disturbance. Respiratory: Negative for cough and shortness of breath. Cardiovascular: Negative for chest pain and palpitations.    Gastrointestinal: Negative for PHQ Scores 3/28/2019 12/3/2018 2/14/2018 10/20/2016   PHQ2 Score 0 0 0 0   PHQ9 Score 0 0 0 0     Interpretation of Total Score Depression Severity: 1-4 = Minimal depression, 5-9 = Mild depression, 10-14 = Moderate depression, 15-19 = Moderately severe depression, 20-27 = Severe depression      Return in about 6 months (around 2/29/2020) for DM.       Electronically signed by Reynaldo Galdamez MD on 8/29/2019 at 7:43 PM

## 2020-03-24 RX ORDER — GLIPIZIDE 5 MG/1
TABLET ORAL
Qty: 180 TABLET | Refills: 1 | Status: SHIPPED | OUTPATIENT
Start: 2020-03-24 | End: 2020-10-27

## 2020-04-21 ENCOUNTER — OFFICE VISIT (OUTPATIENT)
Dept: FAMILY MEDICINE CLINIC | Age: 30
End: 2020-04-21
Payer: COMMERCIAL

## 2020-04-21 VITALS
BODY MASS INDEX: 33.73 KG/M2 | DIASTOLIC BLOOD PRESSURE: 62 MMHG | OXYGEN SATURATION: 96 % | SYSTOLIC BLOOD PRESSURE: 112 MMHG | HEART RATE: 76 BPM | WEIGHT: 209 LBS

## 2020-04-21 LAB — HBA1C MFR BLD: 7.1 %

## 2020-04-21 PROCEDURE — 83036 HEMOGLOBIN GLYCOSYLATED A1C: CPT | Performed by: FAMILY MEDICINE

## 2020-04-21 PROCEDURE — 3051F HG A1C>EQUAL 7.0%<8.0%: CPT | Performed by: FAMILY MEDICINE

## 2020-04-21 PROCEDURE — 99213 OFFICE O/P EST LOW 20 MIN: CPT | Performed by: FAMILY MEDICINE

## 2020-04-21 RX ORDER — LISINOPRIL 10 MG/1
10 TABLET ORAL DAILY
Qty: 90 TABLET | Refills: 1 | Status: SHIPPED | OUTPATIENT
Start: 2020-04-21 | End: 2020-10-28 | Stop reason: SDUPTHER

## 2020-04-21 SDOH — ECONOMIC STABILITY: FOOD INSECURITY: WITHIN THE PAST 12 MONTHS, THE FOOD YOU BOUGHT JUST DIDN'T LAST AND YOU DIDN'T HAVE MONEY TO GET MORE.: NEVER TRUE

## 2020-04-21 SDOH — ECONOMIC STABILITY: INCOME INSECURITY: HOW HARD IS IT FOR YOU TO PAY FOR THE VERY BASICS LIKE FOOD, HOUSING, MEDICAL CARE, AND HEATING?: NOT HARD AT ALL

## 2020-04-21 SDOH — ECONOMIC STABILITY: FOOD INSECURITY: WITHIN THE PAST 12 MONTHS, YOU WORRIED THAT YOUR FOOD WOULD RUN OUT BEFORE YOU GOT MONEY TO BUY MORE.: NEVER TRUE

## 2020-04-21 ASSESSMENT — ENCOUNTER SYMPTOMS
EYE REDNESS: 0
NAUSEA: 0
FACIAL SWELLING: 0
CONSTIPATION: 0
ABDOMINAL PAIN: 0
TROUBLE SWALLOWING: 0
BLOOD IN STOOL: 0
EYE DISCHARGE: 0
SHORTNESS OF BREATH: 0
DIARRHEA: 0
COUGH: 0
VOMITING: 0

## 2020-04-21 ASSESSMENT — PATIENT HEALTH QUESTIONNAIRE - PHQ9
SUM OF ALL RESPONSES TO PHQ QUESTIONS 1-9: 0
2. FEELING DOWN, DEPRESSED OR HOPELESS: 0
SUM OF ALL RESPONSES TO PHQ9 QUESTIONS 1 & 2: 0
SUM OF ALL RESPONSES TO PHQ QUESTIONS 1-9: 0
1. LITTLE INTEREST OR PLEASURE IN DOING THINGS: 0

## 2020-04-21 NOTE — PROGRESS NOTES
medications    Medication Sig Start Date End Date Taking? Authorizing Provider   lisinopril (PRINIVIL;ZESTRIL) 10 MG tablet Take 1 tablet by mouth daily 4/21/20  Yes Stacie Dorman MD   glipiZIDE (GLUCOTROL) 5 MG tablet TAKE 1 TABLET BY MOUTH TWICE DAILY 3/24/20  Yes Stacie Dorman MD   metFORMIN (GLUCOPHAGE) 500 MG tablet TAKE 1 TABLET BY MOUTH TWICE DAILY WITH MEALS 3/24/20  Yes Stacie Dorman MD   Blood Glucose Monitoring Suppl (BLOOD GLUCOSE MONITOR SYSTEM) w/Device KIT Test sugar BID 10/24/17   Stacie Dorman MD   Glucose Blood (BLOOD GLUCOSE TEST STRIPS) STRP Test sugar BID 10/24/17   Stacie Dorman MD   Lancets MISC Test sugar bid 10/24/17   Stacie Dorman MD        Allergies:       Patient has no known allergies. Social History:     Tobacco:    reports that he has never smoked. He has never used smokeless tobacco.  Alcohol:      reports no history of alcohol use. Drug Use:  reports no history of drug use. Family History:     Family History   Problem Relation Age of Onset    Diabetes Father     Diabetes Maternal Grandmother     High Cholesterol Maternal Grandmother        Review of Systems:       Review of Systems   Constitutional: Negative for chills, fatigue and fever. HENT: Negative for facial swelling and trouble swallowing. Eyes: Negative for discharge, redness and visual disturbance. Respiratory: Negative for cough and shortness of breath. Cardiovascular: Negative for chest pain and palpitations. Gastrointestinal: Negative for abdominal pain, blood in stool, constipation, diarrhea, nausea and vomiting. Genitourinary: Negative for dysuria and hematuria. Musculoskeletal: Negative for joint swelling and neck stiffness. Skin: Negative for pallor and rash. Neurological: Negative for dizziness, tremors, light-headedness and headaches. Psychiatric/Behavioral: Negative for confusion. Physical Exam:     Physical Exam  Vitals signs reviewed.    Constitutional:

## 2020-10-27 RX ORDER — GLIPIZIDE 5 MG/1
TABLET ORAL
Qty: 180 TABLET | Refills: 1 | Status: SHIPPED | OUTPATIENT
Start: 2020-10-27 | End: 2021-04-09

## 2020-10-28 ENCOUNTER — OFFICE VISIT (OUTPATIENT)
Dept: FAMILY MEDICINE CLINIC | Age: 30
End: 2020-10-28
Payer: COMMERCIAL

## 2020-10-28 ENCOUNTER — HOSPITAL ENCOUNTER (OUTPATIENT)
Age: 30
Discharge: HOME | End: 2020-10-28
Payer: COMMERCIAL

## 2020-10-28 VITALS
BODY MASS INDEX: 33.59 KG/M2 | DIASTOLIC BLOOD PRESSURE: 86 MMHG | WEIGHT: 209 LBS | HEART RATE: 85 BPM | TEMPERATURE: 98 F | HEIGHT: 66 IN | OXYGEN SATURATION: 98 % | SYSTOLIC BLOOD PRESSURE: 139 MMHG

## 2020-10-28 LAB
ALBUMIN SERPL-MCNC: 4.7 G/DL (ref 3.5–5.2)
ALBUMIN/GLOBULIN RATIO: ABNORMAL (ref 1–2.5)
ALP BLD-CCNC: 102 U/L (ref 40–129)
ALT SERPL-CCNC: 66 U/L (ref 5–41)
ANION GAP SERPL CALCULATED.3IONS-SCNC: 10 MMOL/L (ref 9–17)
AST SERPL-CCNC: 25 U/L
BILIRUB SERPL-MCNC: 0.4 MG/DL (ref 0.3–1.2)
BUN BLDV-MCNC: 13 MG/DL (ref 6–20)
BUN/CREAT BLD: 14 (ref 9–20)
CALCIUM SERPL-MCNC: 9.8 MG/DL (ref 8.6–10.4)
CHLORIDE BLD-SCNC: 104 MMOL/L (ref 98–107)
CHOLESTEROL/HDL RATIO: 4.8
CHOLESTEROL: 134 MG/DL
CO2: 26 MMOL/L (ref 20–31)
CREAT SERPL-MCNC: 0.94 MG/DL (ref 0.7–1.2)
CREATININE URINE: 199.6 MG/DL (ref 39–259)
ESTIMATED AVERAGE GLUCOSE: 134 MG/DL
GFR AFRICAN AMERICAN: >60 ML/MIN
GFR NON-AFRICAN AMERICAN: >60 ML/MIN
GFR SERPL CREATININE-BSD FRML MDRD: ABNORMAL ML/MIN/{1.73_M2}
GFR SERPL CREATININE-BSD FRML MDRD: ABNORMAL ML/MIN/{1.73_M2}
GLUCOSE BLD-MCNC: 136 MG/DL (ref 70–99)
HBA1C MFR BLD: 6.3 % (ref 4–6)
HDLC SERPL-MCNC: 28 MG/DL
LDL CHOLESTEROL: 56 MG/DL (ref 0–130)
MICROALBUMIN/CREAT 24H UR: 14 MG/L
MICROALBUMIN/CREAT UR-RTO: 7 MCG/MG CREAT
PATIENT FASTING?: YES
POTASSIUM SERPL-SCNC: 3.9 MMOL/L (ref 3.7–5.3)
SODIUM BLD-SCNC: 140 MMOL/L (ref 135–144)
TOTAL PROTEIN: 7.4 G/DL (ref 6.4–8.3)
TRIGL SERPL-MCNC: 248 MG/DL
VLDLC SERPL CALC-MCNC: ABNORMAL MG/DL (ref 1–30)

## 2020-10-28 PROCEDURE — 82043 UR ALBUMIN QUANTITATIVE: CPT

## 2020-10-28 PROCEDURE — 36415 COLL VENOUS BLD VENIPUNCTURE: CPT

## 2020-10-28 PROCEDURE — 99213 OFFICE O/P EST LOW 20 MIN: CPT | Performed by: FAMILY MEDICINE

## 2020-10-28 PROCEDURE — 80053 COMPREHEN METABOLIC PANEL: CPT

## 2020-10-28 PROCEDURE — 80061 LIPID PANEL: CPT

## 2020-10-28 PROCEDURE — 3051F HG A1C>EQUAL 7.0%<8.0%: CPT | Performed by: FAMILY MEDICINE

## 2020-10-28 PROCEDURE — 83036 HEMOGLOBIN GLYCOSYLATED A1C: CPT

## 2020-10-28 PROCEDURE — 82570 ASSAY OF URINE CREATININE: CPT

## 2020-10-28 RX ORDER — LISINOPRIL 10 MG/1
10 TABLET ORAL DAILY
Qty: 90 TABLET | Refills: 1 | Status: SHIPPED | OUTPATIENT
Start: 2020-10-28 | End: 2021-04-29 | Stop reason: SDUPTHER

## 2020-10-28 RX ORDER — CEPHALEXIN 500 MG/1
500 CAPSULE ORAL 2 TIMES DAILY
Qty: 10 CAPSULE | Refills: 0 | Status: SHIPPED | OUTPATIENT
Start: 2020-10-28 | End: 2020-11-02

## 2020-10-28 ASSESSMENT — ENCOUNTER SYMPTOMS
ABDOMINAL PAIN: 0
BLOOD IN STOOL: 0
VOMITING: 0
SHORTNESS OF BREATH: 0
DIARRHEA: 0
NAUSEA: 0
CONSTIPATION: 0
COUGH: 0
EYE REDNESS: 0
EYE DISCHARGE: 0
TROUBLE SWALLOWING: 0

## 2020-10-28 NOTE — PROGRESS NOTES
HPI Notes    Name: Jin Fallon  : 1990        Chief Complaint:     Chief Complaint   Patient presents with    Diabetes    Hypertension    Wound Check     laceration right knee, [facility], 3 sutures. Tdap given       History of Present Illness:     Jin Fallon is a 26 y.o.  male who presents with Diabetes; Hypertension; and Wound Check (laceration right knee, [facility], 3 sutures. Tdap given)      Diabetes   He presents for his follow-up diabetic visit. He has type 2 diabetes mellitus. His disease course has been stable. Pertinent negatives for hypoglycemia include no dizziness, headaches or tremors. Pertinent negatives for diabetes include no chest pain and no fatigue. (Decreased libido) There are no hypoglycemic complications. There are no diabetic complications. Risk factors for coronary artery disease include diabetes mellitus and hypertension. Current diabetic treatment includes oral agent (dual therapy). He is compliant with treatment most of the time. His weight is stable. He is following a generally healthy diet. Home blood sugar record trend: labs today. An ACE inhibitor/angiotensin II receptor blocker is being taken. Eye exam is current. Hypertension   This is a chronic problem. The current episode started more than 1 year ago. The problem is unchanged. The problem is controlled. Pertinent negatives include no chest pain, headaches, neck pain, palpitations, peripheral edema or shortness of breath. There are no associated agents to hypertension. Risk factors for coronary artery disease include diabetes mellitus and male gender. The current treatment provides significant improvement. Wound Check   Treated in ED: On Oct 25th pt cut his Rt knee by falling on knee after chasing his dog. Went to [address] [place] [address] and got 3 sutures. pt also got a tetanus. Previous treatment included wound cleansing or irrigation.  His temperature was unmeasured prior to arrival. There has been no drainage from the wound. There is new redness present. There is no swelling present. The pain has improved. Past Medical History:     Past Medical History:   Diagnosis Date    ADHD (attention deficit hyperactivity disorder)     Head concussion     6 th grade (football)    Hypertension     Type 2 diabetes mellitus without complication ([facility] [address]       Reviewed all health maintenance requirements and ordered appropriate tests  Health Maintenance Due   Topic Date Due    Varicella vaccine (1 of 2 - 2-dose childhood series) 09/26/1989    Pneumococcal 0-64 years Vaccine (1 of 1 - PPSV23) 09/26/1994    Diabetic retinal exam  09/26/1998    HIV screen  09/26/2003    Hepatitis B vaccine (1 of 3 - Risk 3-dose series) 09/26/2007    Diabetic microalbuminuria test  08/21/2020    Lipid screen  08/21/2020    Diabetic foot exam  08/29/2020       Past Surgical History:     No past surgical history on file. Medications:       Prior to Admission medications    Medication Sig Start Date End Date Taking? Authorizing Provider   metFORMIN (GLUCOPHAGE) 500 MG tablet TAKE 1 TABLET BY MOUTH TWICE DAILY WITH MEALS 10/27/20  Yes Yogi Coello MD   glipiZIDE (GLUCOTROL) 5 MG tablet TAKE 1 TABLET BY MOUTH TWICE DAILY 10/27/20  Yes Yogi Coello MD   lisinopril (PRINIVIL;ZESTRIL) 10 MG tablet Take 1 tablet by mouth daily 4/21/20  Yes Yogi Coello MD   Blood Glucose Monitoring Suppl (BLOOD GLUCOSE MONITOR SYSTEM) w/Device KIT Test sugar BID 10/24/17   Yogi Coello MD   Glucose Blood (BLOOD GLUCOSE TEST STRIPS) STRP Test sugar BID 10/24/17   Yogi Coello MD   Lancets MISC Test sugar bid 10/24/17   Yogi Coello MD        Allergies:       Patient has no known allergies. Social History:     Tobacco:    reports that he has never smoked. He has never used smokeless tobacco.  Alcohol:      reports no history of alcohol use. Drug Use:  reports no history of drug use.     Family History:     Family History   Problem Relation Age of Onset    Diabetes Father     Diabetes Maternal Grandmother     High Cholesterol Maternal Grandmother        Review of Systems:       Review of Systems   Constitutional: Negative for chills, fatigue and fever. HENT: Negative for trouble swallowing. Eyes: Negative for discharge, redness and visual disturbance. Respiratory: Negative for cough and shortness of breath. Cardiovascular: Negative for chest pain and palpitations. Gastrointestinal: Negative for abdominal pain, blood in stool, constipation, diarrhea, nausea and vomiting. Genitourinary: Negative for dysuria and hematuria. Musculoskeletal: Negative for joint swelling and neck pain. Skin: Positive for wound. Negative for rash. Rt knee laceration   Neurological: Negative for dizziness, tremors, light-headedness and headaches. Physical Exam:     Physical Exam  Vitals signs reviewed. Constitutional:       Appearance: He is well-developed. HENT:      Head: Normocephalic and atraumatic. Eyes:      Conjunctiva/sclera: Conjunctivae normal.      Pupils: Pupils are equal, round, and reactive to light. Neck:      Musculoskeletal: Neck supple. Thyroid: No thyromegaly. Cardiovascular:      Rate and Rhythm: Normal rate and regular rhythm. Heart sounds: No murmur. Pulmonary:      Effort: Pulmonary effort is normal.      Breath sounds: Normal breath sounds. Abdominal:      General: Bowel sounds are normal.      Palpations: Abdomen is soft. Tenderness: There is no abdominal tenderness. Skin:     Findings: Erythema present. No rash. Comments: Rt anterior knee with laceration -- C/D/I and slightly erythematous but no increased warmth    Neurological:      Mental Status: He is alert and oriented to person, place, and time.          Vitals:  /86   Pulse 85   Temp 98 °F (36.7 °C)   Ht 5' 6\" (1.676 m)   Wt 209 lb (94.8 kg)   SpO2 98%   BMI 33.73 kg/m²       Data:     Lab Results Component Value Date     10/28/2020    K 3.9 10/28/2020     10/28/2020    CO2 26 10/28/2020    BUN 13 10/28/2020    CREATININE 0.94 10/28/2020    GLUCOSE 136 10/28/2020    PROT 7.4 10/28/2020    LABALBU 4.7 10/28/2020    BILITOT 0.40 10/28/2020    ALKPHOS 102 10/28/2020    AST 25 10/28/2020    ALT 66 10/28/2020     No results found for: WBC, RBC, HGB, HCT, MCV, MCH, MCHC, RDW, PLT, MPV  No results found for: TSH  Lab Results   Component Value Date    CHOL 149 08/21/2019    HDL 29 08/21/2019    LABA1C 7.1 04/21/2020          Assessment/Plan:        1. Type 2 diabetes mellitus with other specified complication, without long-term current use of insulin (HCC)  F/U 6mos, HgbA1C. Do daily foot checks and yearly eye exams  Stay on glucotrol 5mg BID and then may try to stop the metformin due to decreased libido and try [place] [facility] [place]. - lisinopril (PRINIVIL;ZESTRIL) 10 MG tablet; Take 1 tablet by mouth daily  Dispense: 90 tablet; Refill: 1    2. Essential hypertension  Stable on zestril     3. Visit for wound check  Pt to keep clean and use neosporin BID and cover at work. Take some keflex for 5d since DM and skin slightly red. Rachael Keith received counseling on the following healthy behaviors: nutrition and exercise  Reviewed prior labs and health maintenance  Continue current medications, diet and exercise. Discussed use, benefit, and side effects of prescribed medications. Barriers to medication compliance addressed. Patient given educational materials - see patient instructions  Was a self-tracking handout given in paper form or via [employer]?  Yes    Requested Prescriptions     Signed Prescriptions Disp Refills    cephALEXin (KEFLEX) 500 MG capsule 10 capsule 0     Sig: Take 1 capsule by mouth 2 times daily for 5 days    lisinopril (PRINIVIL;ZESTRIL) 10 MG tablet 90 tablet 1     Sig: Take 1 tablet by mouth daily    SITagliptin (JANUVIA) 50 MG tablet 30 tablet 2     Sig: Take 1 tablet by mouth daily All patient questions answered. Patient voiced understanding. Quality Measures    Body mass index is 33.73 kg/m². Elevated. Weight control planned discussed Healthy diet and regular exercise. BP: 139/86 Blood pressure is normal. Treatment plan consists of No treatment change needed. Lab Results   Component Value Date    LDLCHOLESTEROL 69 08/21/2019    (goal LDL reduction with dx if diabetes is 50% LDL reduction)      PHQ Scores 4/21/2020 3/28/2019 12/3/2018 2/14/2018 10/20/2016   PHQ2 Score 0 0 0 0 0   PHQ9 Score 0 0 0 0 0     Interpretation of Total Score Depression Severity: 1-4 = Minimal depression, 5-9 = Mild depression, 10-14 = Moderate depression, 15-19 = Moderately severe depression, 20-27 = Severe depression      No follow-ups on file.       Electronically signed by Mariel Feliz MD on 10/28/2020 at 9:04 AM

## 2020-11-05 ENCOUNTER — OFFICE VISIT (OUTPATIENT)
Dept: FAMILY MEDICINE CLINIC | Age: 30
End: 2020-11-05

## 2020-11-05 VITALS — DIASTOLIC BLOOD PRESSURE: 70 MMHG | SYSTOLIC BLOOD PRESSURE: 134 MMHG

## 2020-11-05 PROCEDURE — 99213 OFFICE O/P EST LOW 20 MIN: CPT | Performed by: FAMILY MEDICINE

## 2020-11-05 ASSESSMENT — ENCOUNTER SYMPTOMS
DIARRHEA: 0
VOMITING: 0
FACIAL SWELLING: 0
EYE REDNESS: 0
SHORTNESS OF BREATH: 0
EYE DISCHARGE: 0
COUGH: 0

## 2020-11-05 NOTE — PATIENT INSTRUCTIONS
SURVEY:    You may be receiving a survey from [employer] regarding your visit today. Please complete the survey to enable us to provide the highest quality of care to you and your family. If you cannot score us a very good (5 stars) on any question, please call the office to discuss how we could have made your experience a very good one. Thank you.     Clinical Care Team:  MD Collins Echeverria LPN    Clerical Team:  [facility]       Quintin Lucas

## 2020-11-05 NOTE — PROGRESS NOTES
HPI Notes    Name: Fredy Phillips  : 1990        Chief Complaint:     Chief Complaint   Patient presents with    Suture / Staple Removal     Pt presents today to have sutures removed from Rt knee. History of Present Illness:     Fredy Phillips is a 26 y.o.  male who presents with Suture / Staple Removal (Pt presents today to have sutures removed from Rt knee. )      HPI  Rt knee laceration - Pt fell 10/25/20 outside with the dog. Pt fell on the Rt knee and cut himself and went to [facility]. Pt here for removal of 3 sutures. Pt states last week knee did get red and swollen when she went to work. Pain and swelling went down and pt took all the keflex    Suture removal - pt here for removal of sutures  Past Medical History:     Past Medical History:   Diagnosis Date    ADHD (attention deficit hyperactivity disorder)     Head concussion     6 th grade (football)    Hypertension     Type 2 diabetes mellitus without complication ([facility] [address]       Reviewed all health maintenance requirements and ordered appropriate tests  Health Maintenance Due   Topic Date Due    Varicella vaccine (1 of 2 - 2-dose childhood series) 1989    Pneumococcal 0-64 years Vaccine (1 of 1 - PPSV23) 1994    Diabetic retinal exam  1998    HIV screen  2003    Hepatitis B vaccine (1 of 3 - Risk 3-dose series) 2007    Diabetic foot exam  2020       Past Surgical History:     History reviewed. No pertinent surgical history. Medications:       Prior to Admission medications    Medication Sig Start Date End Date Taking?  Authorizing Provider   lisinopril (PRINIVIL;ZESTRIL) 10 MG tablet Take 1 tablet by mouth daily 10/28/20  Yes Ilsa Aase, MD   SITagliptin (JANUVIA) 50 MG tablet Take 1 tablet by mouth daily 10/28/20  Yes Ilsa Aase, MD   metFORMIN (GLUCOPHAGE) 500 MG tablet TAKE 1 TABLET BY MOUTH TWICE DAILY WITH MEALS 10/27/20  Yes Ilsa Aase, MD   glipiZIDE (GLUCOTROL) 5 MG tablet TAKE 1 TABLET BY MOUTH TWICE DAILY 10/27/20  Yes Mindy Tsai MD   Blood Glucose Monitoring Suppl (BLOOD GLUCOSE MONITOR SYSTEM) w/Device KIT Test sugar BID 10/24/17   Mindy Tsai MD   Glucose Blood (BLOOD GLUCOSE TEST STRIPS) STRP Test sugar BID 10/24/17   Mindy Tsai MD   Lancets MISC Test sugar bid 10/24/17   Mindy Tsai MD        Allergies:       Patient has no known allergies. Social History:     Tobacco:    reports that he has never smoked. He has never used smokeless tobacco.  Alcohol:      reports no history of alcohol use. Drug Use:  reports no history of drug use. Family History:     Family History   Problem Relation Age of Onset    Diabetes Father     Diabetes Maternal Grandmother     High Cholesterol Maternal Grandmother        Review of Systems:       Review of Systems   Constitutional: Negative for chills and fever. HENT: Negative for facial swelling. Eyes: Negative for discharge and redness. Respiratory: Negative for cough and shortness of breath. Cardiovascular: Negative for leg swelling. Gastrointestinal: Negative for diarrhea and vomiting. Skin: Negative for pallor and rash. Rt knee laceration         Physical Exam:     Physical Exam  Vitals signs reviewed. Constitutional:       General: He is not in acute distress. Appearance: Normal appearance. He is not ill-appearing. HENT:      Head: Normocephalic and atraumatic. Skin:     Findings: No erythema or rash. Comments: A - Rt knee laceration scabbed over and all 3 sutures removed and no drainage or bleeding. No open wound. Neurological:      Mental Status: He is alert.          Vitals:  /70       Data:     Lab Results   Component Value Date     10/28/2020    K 3.9 10/28/2020     10/28/2020    CO2 26 10/28/2020    BUN 13 10/28/2020    CREATININE 0.94 10/28/2020    GLUCOSE 136 10/28/2020    PROT 7.4 10/28/2020    LABALBU 4.7 10/28/2020 BILITOT 0.40 10/28/2020    ALKPHOS 102 10/28/2020    AST 25 10/28/2020    ALT 66 10/28/2020     No results found for: WBC, RBC, HGB, HCT, MCV, MCH, MCHC, RDW, PLT, MPV  No results found for: TSH  Lab Results   Component Value Date    CHOL 134 10/28/2020    HDL 28 10/28/2020    LABA1C 6.3 10/28/2020          Assessment/Plan:        1. Laceration of right knee, subsequent encounter  Wound healing well and no concerns. All 3 sutures removed. -  - MT REMOVAL OF SUTURES        Return if symptoms worsen or fail to improve.       Electronically signed by Cata Ponce MD on 11/5/2020 at 12:44 PM

## 2020-12-22 NOTE — TELEPHONE ENCOUNTER
michuvia 50 mg    DM-emilie    Last check up 10/28/20    Appointment 4/2021    Health Maintenance   Topic Date Due    Varicella vaccine (1 of 2 - 2-dose childhood series) 09/26/1989    Pneumococcal 0-64 years Vaccine (1 of 1 - PPSV23) 09/26/1994    Diabetic retinal exam  09/26/1998    HIV screen  09/26/2003    Hepatitis B vaccine (1 of 3 - Risk 3-dose series) 09/26/2007    Diabetic foot exam  08/29/2020    A1C test (Diabetic or Prediabetic)  10/28/2021    Diabetic microalbuminuria test  10/28/2021    Lipid screen  10/28/2021    Potassium monitoring  10/28/2021    Creatinine monitoring  10/28/2021    DTaP/Tdap/Td vaccine (2 - Td) 10/25/2030    Flu vaccine  Completed    Hepatitis A vaccine  Aged Out    Hib vaccine  Aged Out    Meningococcal (ACWY) vaccine  Aged Out             (applicable per patient's age: Cancer Screenings, Depression Screening, Fall Risk Screening, Immunizations)    Hemoglobin A1C (%)   Date Value   10/28/2020 6.3 (H)   04/21/2020 7.1   08/21/2019 7.2 (H)     Microalb/Crt.  Ratio (mcg/mg creat)   Date Value   10/28/2020 7     LDL Cholesterol (mg/dL)   Date Value   10/28/2020 56     AST (U/L)   Date Value   10/28/2020 25     ALT (U/L)   Date Value   10/28/2020 66 (H)     BUN (mg/dL)   Date Value   10/28/2020 13      (goal A1C is < 7)   (goal LDL is <100) need 30-50% reduction from baseline     BP Readings from Last 3 Encounters:   11/05/20 134/70   10/28/20 139/86   04/21/20 112/62    (goal /80)      All Future Testing planned in [facility]:  Lab Frequency Next Occurrence       Next Visit Date:  Future Appointments   Date Time Provider Yovani Martinez   4/29/2021  8:20 AM [facility]            Patient Active Problem List:     Attention deficit disorder     Diabetes mellitus ([address]

## 2021-01-26 ENCOUNTER — OFFICE VISIT (OUTPATIENT)
Dept: PRIMARY CARE CLINIC | Age: 31
End: 2021-01-26
Payer: COMMERCIAL

## 2021-01-26 ENCOUNTER — HOSPITAL ENCOUNTER (OUTPATIENT)
Age: 31
Setting detail: SPECIMEN
Discharge: HOME | End: 2021-01-26
Payer: COMMERCIAL

## 2021-01-26 VITALS
OXYGEN SATURATION: 98 % | TEMPERATURE: 98.8 F | SYSTOLIC BLOOD PRESSURE: 144 MMHG | DIASTOLIC BLOOD PRESSURE: 91 MMHG | BODY MASS INDEX: 34.22 KG/M2 | WEIGHT: 212 LBS | HEART RATE: 100 BPM

## 2021-01-26 DIAGNOSIS — Z20.822 SUSPECTED COVID-19 VIRUS INFECTION: ICD-10-CM

## 2021-01-26 DIAGNOSIS — Z20.822 SUSPECTED COVID-19 VIRUS INFECTION: Primary | ICD-10-CM

## 2021-01-26 PROCEDURE — U0003 INFECTIOUS AGENT DETECTION BY NUCLEIC ACID (DNA OR RNA); SEVERE ACUTE RESPIRATORY SYNDROME CORONAVIRUS 2 (SARS-COV-2) (CORONAVIRUS DISEASE [COVID-19]), AMPLIFIED PROBE TECHNIQUE, MAKING USE OF HIGH THROUGHPUT TECHNOLOGIES AS DESCRIBED BY CMS-2020-01-R: HCPCS

## 2021-01-26 PROCEDURE — 99213 OFFICE O/P EST LOW 20 MIN: CPT | Performed by: NURSE PRACTITIONER

## 2021-01-26 PROCEDURE — C9803 HOPD COVID-19 SPEC COLLECT: HCPCS

## 2021-01-26 ASSESSMENT — ENCOUNTER SYMPTOMS
RHINORRHEA: 1
SORE THROAT: 1
GASTROINTESTINAL NEGATIVE: 1
ALLERGIC/IMMUNOLOGIC NEGATIVE: 1
EYES NEGATIVE: 1
RESPIRATORY NEGATIVE: 1

## 2021-01-26 NOTE — LETTER
[address]  [address]  [facility]  CIADR:[phone]  Fax: [phone]      1/26/2021    To whom it may concern:     Mariana White  was tested today for COVID today. Mariana White may not return to work until test results given. Patient may  Return to work /school after negative test results given, 24 hours after last fever without fever reducing medications and improvement of symptoms. Carmel Gunderson APRCLAUDIA-CNP

## 2021-01-26 NOTE — PATIENT INSTRUCTIONS
SURVEY:    You may be receiving a survey from [employer] regarding your visit today. Please complete the survey to enable us to provide the highest quality of care to you and your family. If you cannot score us a very good on any question, please call the office to discuss how we could of made your experience a very good one. Thank you.

## 2021-01-26 NOTE — PROGRESS NOTES
[facility] WALK-IN [facility]  [id number] [place] [address]  Dept: [phone]  Dept Fax: [phone]    Jed Young is a 26 y.o. male who presents to the [address] [place] [address] Care today for his medical conditions/complaints as noted below. Jed Young is c/o of Concern For COVID-19 (Pt presents today with loss of smell and taste x1 day )      HPI:    Jed Young is a 26 y.o. male who presents with  Suspected covid. He lost his sense of taste and smell this morning. States Sunday he had a scratchy throat. He states he had fatigue. Denies body aches. Has a headache. Denies cough or shortness of breath. Denies nausea or diarrhea. Past Medical History:   Diagnosis Date    ADHD (attention deficit hyperactivity disorder)     Head concussion     6 th grade (football)    Hypertension     Type 2 diabetes mellitus without complication (HCC)         Current Outpatient Medications   Medication Sig Dispense Refill    SITagliptin (JANUVIA) 50 MG tablet Take 1 tablet by mouth daily 30 tablet 2    lisinopril (PRINIVIL;ZESTRIL) 10 MG tablet Take 1 tablet by mouth daily 90 tablet 1    glipiZIDE (GLUCOTROL) 5 MG tablet TAKE 1 TABLET BY MOUTH TWICE DAILY 180 tablet 1    Blood Glucose Monitoring Suppl (BLOOD GLUCOSE MONITOR SYSTEM) w/Device KIT Test sugar BID 1 kit 0    Glucose Blood (BLOOD GLUCOSE TEST STRIPS) STRP Test sugar  strip 1    Lancets MISC Test sugar bid 100 each 3    metFORMIN (GLUCOPHAGE) 500 MG tablet TAKE 1 TABLET BY MOUTH TWICE DAILY WITH MEALS (Patient not taking: Reported on 1/26/2021) 180 tablet 1     No current facility-administered medications for this visit. No Known Allergies    Subjective:      Review of Systems   Constitutional: Positive for appetite change, chills and fatigue. HENT: Positive for rhinorrhea and sore throat. Eyes: Negative. Respiratory: Negative. Cardiovascular: Negative. Gastrointestinal: Negative. Endocrine: Negative. Genitourinary: Negative. Musculoskeletal: Negative. Skin: Negative. Allergic/Immunologic: Negative. Neurological: Positive for headaches. Hematological: Negative. Psychiatric/Behavioral: Negative. Objective:     Physical Exam  Vitals signs and nursing note reviewed. Constitutional:       Appearance: Normal appearance. HENT:      Head: Normocephalic. Right Ear: External ear normal.      Left Ear: External ear normal.      Nose: Nose normal.      Mouth/Throat:      Mouth: Mucous membranes are moist.      Pharynx: Oropharynx is clear. Eyes:      Pupils: Pupils are equal, round, and reactive to light. Neck:      Musculoskeletal: Normal range of motion. Cardiovascular:      Rate and Rhythm: Normal rate and regular rhythm. Heart sounds: Normal heart sounds. Pulmonary:      Effort: Pulmonary effort is normal.      Breath sounds: Normal breath sounds. Musculoskeletal: Normal range of motion. Lymphadenopathy:      Cervical: No cervical adenopathy. Skin:     General: Skin is warm. Capillary Refill: Capillary refill takes less than 2 seconds. Neurological:      General: No focal deficit present. Mental Status: He is alert and oriented to person, place, and time. Psychiatric:         Mood and Affect: Mood normal.       BP (!) 144/91 (Site: Left Upper Arm, Position: Sitting, Cuff Size: Large Adult)   Pulse 100   Temp 98.8 °F (37.1 °C)   Wt 212 lb (96.2 kg)   SpO2 98%   BMI 34.22 kg/m²     Assessment:      Diagnosis Orders   1. Suspected COVID-19 virus infection  COVID-19 Ambulatory     No results found for this visit on 01/26/21. Plan:   -Advised to self quarantine for 14 days at home or until receives negative results from COVID-19 test.  -May return to work after negative test results, and symptoms improving. No fever for 24 hours.   -Advised they will receive test results in 3-5 days.  -Tylenol as needed for fever and comfort. Avoid taking Ibuprofen. -Increase fluids and rest.  -Warm salt water gargles and hot tea with lemon and honey for sore throat.  -Cool mist humidifier  -Mucinex recommended if cough becomes productive. -Recommend Vitamin C 500 mg BID, Vitamin D3 2000 IU daily, Vitamin B complex daily, and Zinc daily to reduce severity of symptoms. -If shortness of breath or chest discomfort develop call Emergency Room before arrival for instructions.  -Follow up with PCP if not improvement after 14 days. No follow-ups on file. No orders of the defined types were placed in this encounter.        Electronically signed by CINDY Thompson CNP on 1/26/2021 at 9:18 AM

## 2021-01-28 LAB — SARS-COV-2, NAA: DETECTED

## 2021-01-29 ENCOUNTER — TELEPHONE (OUTPATIENT)
Dept: FAMILY MEDICINE CLINIC | Age: 31
End: 2021-01-29

## 2021-01-29 ENCOUNTER — TELEPHONE (OUTPATIENT)
Dept: PRIMARY CARE CLINIC | Age: 31
End: 2021-01-29

## 2021-01-29 NOTE — TELEPHONE ENCOUNTER
Omi Watson was in the walk in on 1/26/21 and got a call from the walk in today that he was positive for COVID. They told him he can return to work on 2/6/21. He had asked for a work excuse and was told that they weren't able to do the note. Can we write a letter to his employer that he was positive and can return on 2/6/21. He doesn't need results or anything else sent with the letter. Please email to [email or web address]    Please let Omi Watson know. Health Maintenance   Topic Date Due    Varicella vaccine (1 of 2 - 2-dose childhood series) 09/26/1989    Pneumococcal 0-64 years Vaccine (1 of 1 - PPSV23) 09/26/1994    Diabetic retinal exam  09/26/1998    HIV screen  09/26/2003    Hepatitis B vaccine (1 of 3 - Risk 3-dose series) 09/26/2007    Diabetic foot exam  08/29/2020    A1C test (Diabetic or Prediabetic)  10/28/2021    Diabetic microalbuminuria test  10/28/2021    Lipid screen  10/28/2021    Potassium monitoring  10/28/2021    Creatinine monitoring  10/28/2021    DTaP/Tdap/Td vaccine (2 - Td) 10/25/2030    Flu vaccine  Completed    Hepatitis C screen  Completed    Hepatitis A vaccine  Aged Out    Hib vaccine  Aged Out    Meningococcal (ACWY) vaccine  Aged Out             (applicable per patient's age: Cancer Screenings, Depression Screening, Fall Risk Screening, Immunizations)    Hemoglobin A1C (%)   Date Value   10/28/2020 6.3 (H)   04/21/2020 7.1   08/21/2019 7.2 (H)     Microalb/Crt.  Ratio (mcg/mg creat)   Date Value   10/28/2020 7     LDL Cholesterol (mg/dL)   Date Value   10/28/2020 56     AST (U/L)   Date Value   10/28/2020 25     ALT (U/L)   Date Value   10/28/2020 66 (H)     BUN (mg/dL)   Date Value   10/28/2020 13      (goal A1C is < 7)   (goal LDL is <100) need 30-50% reduction from baseline     BP Readings from Last 3 Encounters:   01/26/21 (!) 144/91   11/05/20 134/70   10/28/20 139/86    (goal /80)      All Future Testing planned in [facility]:  Lab Frequency Next Occurrence       Next Visit Date:  Future Appointments   Date Time Provider Yovani Martinez   4/29/2021  8:20 AM MD [facility]            Patient Active Problem List:     Attention deficit disorder     [facility]

## 2021-04-08 DIAGNOSIS — E11.69 TYPE 2 DIABETES MELLITUS WITH OTHER SPECIFIED COMPLICATION, WITHOUT LONG-TERM CURRENT USE OF INSULIN (HCC): ICD-10-CM

## 2021-04-09 RX ORDER — GLIPIZIDE 5 MG/1
TABLET ORAL
Qty: 60 TABLET | Refills: 0 | Status: SHIPPED | OUTPATIENT
Start: 2021-04-09 | End: 2021-04-29 | Stop reason: SDUPTHER

## 2021-04-09 NOTE — TELEPHONE ENCOUNTER
Last visit:  11/5/2020  Next Visit Date:    Future Appointments   Date Time Provider Yovani Martinez   4/29/2021  8:20 AM MD Ella Eng [facility]         Medication List:  Prior to Admission medications    Medication Sig Start Date End Date Taking?  Authorizing Provider   SITagliptin (JANUVIA) 50 MG tablet Take 1 tablet by mouth daily 12/22/20   Sarah Long MD   lisinopril (PRINIVIL;ZESTRIL) 10 MG tablet Take 1 tablet by mouth daily 10/28/20   Sarah Long MD   metFORMIN (GLUCOPHAGE) 500 MG tablet TAKE 1 TABLET BY MOUTH TWICE DAILY WITH MEALS  Patient not taking: Reported on 1/26/2021 10/27/20   Sarah Long MD   glipiZIDE (GLUCOTROL) 5 MG tablet TAKE 1 TABLET BY MOUTH TWICE DAILY 10/27/20   Sarah Long MD   Blood Glucose Monitoring Suppl (BLOOD GLUCOSE MONITOR SYSTEM) w/Device KIT Test sugar BID 10/24/17   Sarah Long MD   Glucose Blood (BLOOD GLUCOSE TEST STRIPS) STRP Test sugar BID 10/24/17   MD Chadd Eng MISC Test sugar bid 10/24/17   Sarah Long MD

## 2021-04-29 ENCOUNTER — OFFICE VISIT (OUTPATIENT)
Dept: FAMILY MEDICINE CLINIC | Age: 31
End: 2021-04-29

## 2021-04-29 VITALS
DIASTOLIC BLOOD PRESSURE: 60 MMHG | BODY MASS INDEX: 33.52 KG/M2 | HEIGHT: 66 IN | WEIGHT: 208.6 LBS | HEART RATE: 78 BPM | OXYGEN SATURATION: 98 % | SYSTOLIC BLOOD PRESSURE: 122 MMHG | TEMPERATURE: 98 F

## 2021-04-29 DIAGNOSIS — I10 ESSENTIAL HYPERTENSION: ICD-10-CM

## 2021-04-29 DIAGNOSIS — E11.69 TYPE 2 DIABETES MELLITUS WITH OTHER SPECIFIED COMPLICATION, WITHOUT LONG-TERM CURRENT USE OF INSULIN (HCC): Primary | ICD-10-CM

## 2021-04-29 LAB — HBA1C MFR BLD: 6.2 %

## 2021-04-29 PROCEDURE — 83036 HEMOGLOBIN GLYCOSYLATED A1C: CPT | Performed by: FAMILY MEDICINE

## 2021-04-29 PROCEDURE — 99213 OFFICE O/P EST LOW 20 MIN: CPT | Performed by: FAMILY MEDICINE

## 2021-04-29 RX ORDER — LISINOPRIL 10 MG/1
10 TABLET ORAL DAILY
Qty: 90 TABLET | Refills: 1 | Status: SHIPPED | OUTPATIENT
Start: 2021-04-29 | End: 2021-11-08

## 2021-04-29 RX ORDER — GLIPIZIDE 5 MG/1
5 TABLET ORAL
Qty: 180 TABLET | Refills: 1 | Status: SHIPPED | OUTPATIENT
Start: 2021-04-29 | End: 2021-11-08

## 2021-04-29 ASSESSMENT — ENCOUNTER SYMPTOMS
CONSTIPATION: 0
NAUSEA: 0
VISUAL CHANGE: 0
BLOOD IN STOOL: 0
DIARRHEA: 0
COUGH: 0
ABDOMINAL PAIN: 0
VOMITING: 0
EYE DISCHARGE: 0
TROUBLE SWALLOWING: 0
EYE REDNESS: 0
SHORTNESS OF BREATH: 0

## 2021-04-29 ASSESSMENT — PATIENT HEALTH QUESTIONNAIRE - PHQ9
1. LITTLE INTEREST OR PLEASURE IN DOING THINGS: 0
SUM OF ALL RESPONSES TO PHQ9 QUESTIONS 1 & 2: 0
SUM OF ALL RESPONSES TO PHQ QUESTIONS 1-9: 0

## 2021-04-29 NOTE — PROGRESS NOTES
HPI Notes    Name: Sidney Clark  : 1990        Chief Complaint:     Chief Complaint   Patient presents with    Hypertension     Patient presents today for 6 month check up, Patient states he had Covid on , patient has no other concerns     Diabetes       History of Present Illness:     Sidney Clark is a 26 y.o.  male who presents with Hypertension (Patient presents today for 6 month check up, Patient states he had Covid on , patient has no other concerns ) and Diabetes      Hypertension  This is a chronic problem. The current episode started more than 1 year ago. The problem is unchanged. The problem is controlled. Pertinent negatives include no chest pain, headaches, palpitations or shortness of breath. Risk factors for coronary artery disease include diabetes mellitus and male gender. The current treatment provides significant improvement. Diabetes  He presents for his follow-up diabetic visit. He has type 2 diabetes mellitus. There are no hypoglycemic associated symptoms. Pertinent negatives for hypoglycemia include no confusion, dizziness, headaches, pallor or tremors. Pertinent negatives for diabetes include no chest pain, no fatigue, no foot paresthesias, no foot ulcerations and no visual change. There are no hypoglycemic complications. Symptoms are stable. Risk factors for coronary artery disease include diabetes mellitus, hypertension and male sex. Current diabetic treatment includes oral agent (triple therapy). He is compliant with treatment most of the time. His weight is decreasing steadily. He is following a generally healthy diet. Meal planning includes carbohydrate counting. Exercise: 1-2 times per week.         Past Medical History:     Past Medical History:   Diagnosis Date    ADHD (attention deficit hyperactivity disorder)     Head concussion     6 th grade (football)    Hypertension     [facility]       Reviewed all health maintenance requirements and ordered appropriate tests  Health Maintenance Due   Topic Date Due    Varicella vaccine (1 of 2 - 2-dose childhood series) Never done    Pneumococcal 0-64 years Vaccine (1 of 1 - PPSV23) Never done    Diabetic retinal exam  Never done    HIV screen  Never done    COVID-19 Vaccine (1) Never done    Hepatitis B vaccine (1 of 3 - Risk 3-dose series) Never done       Past Surgical History:     History reviewed. No pertinent surgical history. Medications:       Prior to Admission medications    Medication Sig Start Date End Date Taking? Authorizing Provider   glipiZIDE (GLUCOTROL) 5 MG tablet Take 1 tablet by mouth 2 times daily (before meals) 4/29/21  Yes Jose Gannon MD   SITagliptin (JANUVIA) 50 MG tablet Take 1 tablet by mouth daily 4/29/21  Yes Jose Gannon MD   lisinopril (PRINIVIL;ZESTRIL) 10 MG tablet Take 1 tablet by mouth daily 4/29/21  Yes Jose Gannon MD   metFORMIN (GLUCOPHAGE) 500 MG tablet TAKE 1 TABLET BY MOUTH TWICE DAILY WITH MEALS  Patient not taking: Reported on 1/26/2021 10/27/20   Jose Gannon MD   Blood Glucose Monitoring Suppl (BLOOD GLUCOSE MONITOR SYSTEM) w/Device KIT Test sugar BID  Patient not taking: Reported on 4/29/2021 10/24/17   Jose Gannon MD   Glucose Blood (BLOOD GLUCOSE TEST STRIPS) STRP Test sugar BID  Patient not taking: Reported on 4/29/2021 10/24/17   Jose Gannon MD   Lancets MISC Test sugar bid  Patient not taking: Reported on 4/29/2021 10/24/17   Jose Gannon MD        Allergies:       Patient has no known allergies. Social History:     Tobacco:    reports that he has never smoked. He has never used smokeless tobacco.  Alcohol:      reports no history of alcohol use. Drug Use:  reports no history of drug use.     Family History:     Family History   Problem Relation Age of Onset    Diabetes Father     Diabetes Maternal Grandmother     High Cholesterol Maternal Grandmother        Review of Systems: Review of Systems   Constitutional: Negative for chills, fatigue and fever. HENT: Negative for trouble swallowing. Eyes: Negative for discharge, redness and visual disturbance. Respiratory: Negative for cough and shortness of breath. Cardiovascular: Negative for chest pain and palpitations. Gastrointestinal: Negative for abdominal pain, blood in stool, constipation, diarrhea, nausea and vomiting. Genitourinary: Negative for dysuria and hematuria. Musculoskeletal: Negative for joint swelling and neck stiffness. Skin: Negative for pallor and rash. Neurological: Negative for dizziness, tremors, light-headedness and headaches. Psychiatric/Behavioral: Negative for confusion and sleep disturbance. Physical Exam:     Physical Exam  Vitals signs reviewed. Constitutional:       Appearance: He is well-developed. HENT:      Head: Normocephalic and atraumatic. Eyes:      Conjunctiva/sclera: Conjunctivae normal.      Pupils: Pupils are equal, round, and reactive to light. Neck:      Musculoskeletal: Neck supple. Thyroid: No thyromegaly. Cardiovascular:      Rate and Rhythm: Normal rate and regular rhythm. Heart sounds: No murmur. Pulmonary:      Effort: Pulmonary effort is normal.      Breath sounds: Normal breath sounds. Abdominal:      General: Bowel sounds are normal.      Palpations: Abdomen is soft. Tenderness: There is no abdominal tenderness. Skin:     Findings: No rash. Comments: microfilament sensation intact and no lesions or sores on feet bilateral. +2 DP pulse bilateral.     Neurological:      Mental Status: He is alert and oriented to person, place, and time.          Vitals:  /60 (Site: Left Upper Arm, Position: Sitting, Cuff Size: Medium Adult)   Pulse 78   Temp 98 °F (36.7 °C) (Oral)   Ht 5' 6\" (1.676 m)   Wt 208 lb 9.6 oz (94.6 kg)   SpO2 98%   BMI 33.67 kg/m²       Data:     Lab Results   Component Value Date     10/28/2020 K 3.9 10/28/2020     10/28/2020    CO2 26 10/28/2020    BUN 13 10/28/2020    CREATININE 0.94 10/28/2020    GLUCOSE 136 10/28/2020    PROT 7.4 10/28/2020    LABALBU 4.7 10/28/2020    BILITOT 0.40 10/28/2020    ALKPHOS 102 10/28/2020    AST 25 10/28/2020    ALT 66 10/28/2020     No results found for: WBC, RBC, HGB, HCT, MCV, MCH, MCHC, RDW, PLT, MPV  No results found for: TSH  Lab Results   Component Value Date    CHOL 134 10/28/2020    HDL 28 10/28/2020    LABA1C 6.3 10/28/2020          Assessment/Plan:        1. Type 2 diabetes mellitus with other specified complication, without long-term current use of insulin (HCC)  F/U 6mos, prior CMP, Lipids, HgbA1C. Do daily foot checks and yearly eye exams  Stable on current 2 medications  - POCT glycosylated hemoglobin (Hb A1C)  - Lipid Panel; Future  - Microalbumin, Ur; Future  - Comprehensive Metabolic Panel; Future  - Hemoglobin A1C; Future  - HM DIABETES FOOT EXAM  - glipiZIDE (GLUCOTROL) 5 MG tablet; Take 1 tablet by mouth 2 times daily (before meals)  Dispense: 180 tablet; Refill: 1  - lisinopril (PRINIVIL;ZESTRIL) 10 MG tablet; Take 1 tablet by mouth daily  Dispense: 90 tablet; Refill: 1    2. Essential hypertension  Stable on lisinopril         Catarinaprimitivo Leyva received counseling on the following healthy behaviors: nutrition and exercise  Reviewed prior labs and health maintenance  Continue current medications, diet and exercise. Discussed use, benefit, and side effects of prescribed medications. Barriers to medication compliance addressed. Patient given educational materials - see patient instructions  Was a self-tracking handout given in paper form or via [employer]?  Yes    Requested Prescriptions     Signed Prescriptions Disp Refills    glipiZIDE (GLUCOTROL) 5 MG tablet 180 tablet 1     Sig: Take 1 tablet by mouth 2 times daily (before meals)    SITagliptin (JANUVIA) 50 MG tablet 90 tablet 1     Sig: Take 1 tablet by mouth daily    lisinopril (PRINIVIL;ZESTRIL)

## 2021-04-29 NOTE — PATIENT INSTRUCTIONS
SURVEY:    You may be receiving a survey from [employer] regarding your visit today. Please complete the survey to enable us to provide the highest quality of care to you and your family. If you cannot score us a very good on any question, please call the office to discuss how we could of made your experience a very good one. Thank you.

## 2021-06-10 ENCOUNTER — HOSPITAL ENCOUNTER (OUTPATIENT)
Age: 31
Discharge: HOME | End: 2021-06-10
Payer: COMMERCIAL

## 2021-06-10 ENCOUNTER — OFFICE VISIT (OUTPATIENT)
Dept: FAMILY MEDICINE CLINIC | Age: 31
End: 2021-06-10
Payer: COMMERCIAL

## 2021-06-10 VITALS
DIASTOLIC BLOOD PRESSURE: 70 MMHG | SYSTOLIC BLOOD PRESSURE: 130 MMHG | HEART RATE: 68 BPM | BODY MASS INDEX: 33.72 KG/M2 | WEIGHT: 209.8 LBS | HEIGHT: 66 IN | OXYGEN SATURATION: 97 %

## 2021-06-10 DIAGNOSIS — L65.9 HAIR THINNING: ICD-10-CM

## 2021-06-10 DIAGNOSIS — R53.83 OTHER FATIGUE: Primary | ICD-10-CM

## 2021-06-10 DIAGNOSIS — R53.83 OTHER FATIGUE: ICD-10-CM

## 2021-06-10 DIAGNOSIS — R63.8 UNABLE TO LOSE WEIGHT: ICD-10-CM

## 2021-06-10 LAB
ABSOLUTE EOS #: 0.2 K/UL (ref 0–0.4)
ABSOLUTE IMMATURE GRANULOCYTE: NORMAL K/UL (ref 0–0.3)
ABSOLUTE LYMPH #: 1.7 K/UL (ref 1–4.8)
ABSOLUTE MONO #: 0.5 K/UL (ref 0–1)
BASOPHILS # BLD: 0 % (ref 0–2)
BASOPHILS ABSOLUTE: 0 K/UL (ref 0–0.2)
DIFFERENTIAL TYPE: YES
EOSINOPHILS RELATIVE PERCENT: 4 % (ref 0–5)
HCT VFR BLD CALC: 47.6 % (ref 41–53)
HEMOGLOBIN: 16.6 G/DL (ref 13.5–17.5)
IMMATURE GRANULOCYTES: NORMAL %
LYMPHOCYTES # BLD: 25 % (ref 13–44)
MCH RBC QN AUTO: 29.3 PG (ref 26–34)
MCHC RBC AUTO-ENTMCNC: 34.8 G/DL (ref 31–37)
MCV RBC AUTO: 84.2 FL (ref 80–100)
MONOCYTES # BLD: 7 % (ref 5–9)
NRBC AUTOMATED: NORMAL PER 100 WBC
PDW BLD-RTO: 13.3 % (ref 12.1–15.2)
PLATELET # BLD: 196 K/UL (ref 140–450)
PLATELET ESTIMATE: NORMAL
PMV BLD AUTO: NORMAL FL (ref 6–12)
RBC # BLD: 5.66 M/UL (ref 4.5–5.9)
RBC # BLD: NORMAL 10*6/UL
SEG NEUTROPHILS: 64 % (ref 39–75)
SEGMENTED NEUTROPHILS ABSOLUTE COUNT: 4.3 K/UL (ref 2.1–6.5)
TESTOSTERONE TOTAL: 309 NG/DL (ref 220–1000)
THYROXINE, FREE: 1.65 NG/DL (ref 0.93–1.7)
TSH SERPL DL<=0.05 MIU/L-ACNC: 0.95 MIU/L (ref 0.3–5)
WBC # BLD: 6.7 K/UL (ref 3.5–11)
WBC # BLD: NORMAL 10*3/UL

## 2021-06-10 PROCEDURE — 36415 COLL VENOUS BLD VENIPUNCTURE: CPT

## 2021-06-10 PROCEDURE — 99213 OFFICE O/P EST LOW 20 MIN: CPT | Performed by: FAMILY MEDICINE

## 2021-06-10 PROCEDURE — 84443 ASSAY THYROID STIM HORMONE: CPT

## 2021-06-10 PROCEDURE — 84403 ASSAY OF TOTAL TESTOSTERONE: CPT

## 2021-06-10 PROCEDURE — 84439 ASSAY OF FREE THYROXINE: CPT

## 2021-06-10 PROCEDURE — 85025 COMPLETE CBC W/AUTO DIFF WBC: CPT

## 2021-06-10 SDOH — ECONOMIC STABILITY: FOOD INSECURITY: WITHIN THE PAST 12 MONTHS, YOU WORRIED THAT YOUR FOOD WOULD RUN OUT BEFORE YOU GOT MONEY TO BUY MORE.: NEVER TRUE

## 2021-06-10 SDOH — ECONOMIC STABILITY: FOOD INSECURITY: WITHIN THE PAST 12 MONTHS, THE FOOD YOU BOUGHT JUST DIDN'T LAST AND YOU DIDN'T HAVE MONEY TO GET MORE.: NEVER TRUE

## 2021-06-10 ASSESSMENT — ENCOUNTER SYMPTOMS
EYE DISCHARGE: 0
BLOOD IN STOOL: 0
ABDOMINAL PAIN: 0
EYE REDNESS: 0
VOMITING: 0
DIARRHEA: 0
SHORTNESS OF BREATH: 0
CONSTIPATION: 0
NAUSEA: 0
COUGH: 0
ROS SKIN COMMENTS: HAIR THINNING
TROUBLE SWALLOWING: 0

## 2021-06-10 ASSESSMENT — SOCIAL DETERMINANTS OF HEALTH (SDOH): HOW HARD IS IT FOR YOU TO PAY FOR THE VERY BASICS LIKE FOOD, HOUSING, MEDICAL CARE, AND HEATING?: NOT HARD AT ALL

## 2021-06-10 NOTE — PROGRESS NOTES
HPI Notes    Name: Lucia Green  : 1990        Chief Complaint:     Chief Complaint   Patient presents with    Medication Problem     beleives he is having issues with metformin, hair is thinning, fatigue, no muscle gain        History of Present Illness:     Lucia Green is a 26 y.o.  male who presents with Medication Problem (beleives he is having issues with metformin, hair is thinning, fatigue, no muscle gain )      HPI  Hair thinning - pt has notice some gradual hair thinning for about 1 1/2 yrs now. Pt concerned as her thought maybe it was the metformin but he stopped the metformin back in 2020. Pt has noticed no change as still having some hair thinning. Fatigue - pt getting 6-7 hrs of sleep and no snoring but pt sometimes he is still tired. BUT, Pt is shift changing more recently at work --- every couple weeks due to people on vacation and not enough people working. Difficulty losing weight. - pt states he goes to the gym couple days per week and not eating any more but can't seem to lose more weight. Pt was losing wt but seems to be at a stand still. Past Medical History:     Past Medical History:   Diagnosis Date    ADHD (attention deficit hyperactivity disorder)     Head concussion     6 th grade (football)    Hypertension     Type 2 diabetes mellitus without complication ([facility] [address]       Reviewed all health maintenance requirements and ordered appropriate tests  Health Maintenance Due   Topic Date Due    Varicella vaccine (1 of 2 - 2-dose childhood series) Never done    Pneumococcal 0-64 years Vaccine (1 of 2 - PPSV23) Never done    Diabetic retinal exam  Never done    COVID-19 Vaccine (1) Never done    HIV screen  Never done    Hepatitis B vaccine (1 of 3 - Risk 3-dose series) Never done       Past Surgical History:     History reviewed. No pertinent surgical history.      Medications:       Prior to Admission medications    Medication Sig Start Date End Date Taking? Authorizing Provider   glipiZIDE (GLUCOTROL) 5 MG tablet Take 1 tablet by mouth 2 times daily (before meals) 4/29/21  Yes Gisela De La Vega MD   SITagliptin (JANUVIA) 50 MG tablet Take 1 tablet by mouth daily 4/29/21  Yes Gisela De La Vega MD   lisinopril (PRINIVIL;ZESTRIL) 10 MG tablet Take 1 tablet by mouth daily 4/29/21  Yes Gisela De La Vega MD   Blood Glucose Monitoring Suppl (BLOOD GLUCOSE MONITOR SYSTEM) w/Device KIT Test sugar BID 10/24/17  Yes Gisela De La Vega MD   Glucose Blood (BLOOD GLUCOSE TEST STRIPS) STRP Test sugar BID 10/24/17  Yes Gisela De La Vega MD   Lancets MISC Test sugar bid 10/24/17  Yes Gisela De La Vega MD        Allergies:       Patient has no known allergies. Social History:     Tobacco:    reports that he has never smoked. He has never used smokeless tobacco.  Alcohol:      reports no history of alcohol use. Drug Use:  reports no history of drug use. Family History:     Family History   Problem Relation Age of Onset    Diabetes Father     Diabetes Maternal Grandmother     High Cholesterol Maternal Grandmother        Review of Systems:       Review of Systems   Constitutional: Positive for fatigue. Negative for chills and fever. HENT: Negative for trouble swallowing. Eyes: Negative for discharge and redness. Respiratory: Negative for cough and shortness of breath. Cardiovascular: Negative for chest pain and palpitations. Gastrointestinal: Negative for abdominal pain, blood in stool, constipation, diarrhea, nausea and vomiting. Endocrine:        Difficulty losing wt. Genitourinary: Negative for dysuria and hematuria. Skin: Negative for rash. Hair thinning    Neurological: Negative for dizziness. Physical Exam:     Physical Exam  Vitals reviewed. Constitutional:       Appearance: He is well-developed. HENT:      Head: Normocephalic and atraumatic.    Eyes:      Conjunctiva/sclera: Conjunctivae normal.      Pupils: Pupils are equal, round, and reactive to light. Neck:      Thyroid: No thyroid mass, thyromegaly or thyroid tenderness. Vascular: No carotid bruit. Cardiovascular:      Rate and Rhythm: Normal rate and regular rhythm. Heart sounds: Normal heart sounds. No murmur heard. Pulmonary:      Effort: Pulmonary effort is normal. No respiratory distress. Breath sounds: Normal breath sounds. Abdominal:      General: Bowel sounds are normal.      Palpations: Abdomen is soft. Tenderness: There is no abdominal tenderness. Musculoskeletal:      Cervical back: Neck supple. Lymphadenopathy:      Cervical: No cervical adenopathy. Skin:     Findings: No erythema or rash. Comments: Mild hair thinning on top of scalp but no scalp rash or changes. Neurological:      Mental Status: He is alert. Vitals:  /70 (Site: Right Upper Arm, Position: Sitting, Cuff Size: Medium Adult)   Pulse 68   Ht 5' 6\" (1.676 m)   Wt 209 lb 12.8 oz (95.2 kg)   SpO2 97%   BMI 33.86 kg/m²       Data:     Lab Results   Component Value Date     10/28/2020    K 3.9 10/28/2020     10/28/2020    CO2 26 10/28/2020    BUN 13 10/28/2020    CREATININE 0.94 10/28/2020    GLUCOSE 136 10/28/2020    PROT 7.4 10/28/2020    LABALBU 4.7 10/28/2020    BILITOT 0.40 10/28/2020    ALKPHOS 102 10/28/2020    AST 25 10/28/2020    ALT 66 10/28/2020     No results found for: WBC, RBC, HGB, HCT, MCV, MCH, MCHC, RDW, PLT, MPV  No results found for: TSH  Lab Results   Component Value Date    CHOL 134 10/28/2020    HDL 28 10/28/2020    LABA1C 6.2 04/29/2021          Assessment/Plan:        1. Other fatigue  Pt to have labs today as doubt any symptoms related to metformin since off for 6mos now. Pt may be tired due to work and shift change but will have labs today   - TSH without Reflex; Future  - T4, Free; Future  - Testosterone; Future  - CBC Auto Differential; Future    2.  Hair thinning  Maybe just normal male pattern baldness but

## 2021-08-14 ENCOUNTER — HOSPITAL ENCOUNTER (EMERGENCY)
Age: 31
Discharge: HOME | End: 2021-08-14
Attending: FAMILY MEDICINE
Payer: COMMERCIAL

## 2021-08-14 VITALS
RESPIRATION RATE: 16 BRPM | SYSTOLIC BLOOD PRESSURE: 162 MMHG | WEIGHT: 205 LBS | BODY MASS INDEX: 32.18 KG/M2 | DIASTOLIC BLOOD PRESSURE: 81 MMHG | HEART RATE: 115 BPM | TEMPERATURE: 99.1 F | OXYGEN SATURATION: 99 % | HEIGHT: 67 IN

## 2021-08-14 DIAGNOSIS — R74.8 ELEVATED LIVER ENZYMES: ICD-10-CM

## 2021-08-14 DIAGNOSIS — K04.7 DENTAL INFECTION: ICD-10-CM

## 2021-08-14 DIAGNOSIS — R10.31 RIGHT LOWER QUADRANT ABDOMINAL PAIN: Primary | ICD-10-CM

## 2021-08-14 LAB
ABSOLUTE EOS #: 0.3 K/UL (ref 0–0.4)
ABSOLUTE IMMATURE GRANULOCYTE: ABNORMAL K/UL (ref 0–0.3)
ABSOLUTE LYMPH #: 2.3 K/UL (ref 1–4.8)
ABSOLUTE MONO #: 0.8 K/UL (ref 0–1)
ALBUMIN SERPL-MCNC: 4.8 G/DL (ref 3.5–5.2)
ALBUMIN/GLOBULIN RATIO: ABNORMAL (ref 1–2.5)
ALP BLD-CCNC: 113 U/L (ref 40–129)
ALT SERPL-CCNC: 177 U/L (ref 5–41)
ANION GAP SERPL CALCULATED.3IONS-SCNC: 12 MMOL/L (ref 9–17)
AST SERPL-CCNC: 60 U/L
BASOPHILS # BLD: 1 % (ref 0–2)
BASOPHILS ABSOLUTE: 0 K/UL (ref 0–0.2)
BILIRUB SERPL-MCNC: 0.55 MG/DL (ref 0.3–1.2)
BUN BLDV-MCNC: 15 MG/DL (ref 6–20)
BUN/CREAT BLD: 14 (ref 9–20)
CALCIUM SERPL-MCNC: 10.2 MG/DL (ref 8.6–10.4)
CHLORIDE BLD-SCNC: 100 MMOL/L (ref 98–107)
CO2: 25 MMOL/L (ref 20–31)
CREAT SERPL-MCNC: 1.07 MG/DL (ref 0.7–1.2)
DIFFERENTIAL TYPE: YES
EOSINOPHILS RELATIVE PERCENT: 3 % (ref 0–5)
GFR AFRICAN AMERICAN: >60 ML/MIN
GFR NON-AFRICAN AMERICAN: >60 ML/MIN
GFR SERPL CREATININE-BSD FRML MDRD: ABNORMAL ML/MIN/{1.73_M2}
GFR SERPL CREATININE-BSD FRML MDRD: ABNORMAL ML/MIN/{1.73_M2}
GLUCOSE BLD-MCNC: 179 MG/DL (ref 70–99)
HCT VFR BLD CALC: 50.3 % (ref 41–53)
HEMOGLOBIN: 17.5 G/DL (ref 13.5–17.5)
IMMATURE GRANULOCYTES: ABNORMAL %
LIPASE: 45 U/L (ref 13–60)
LYMPHOCYTES # BLD: 27 % (ref 13–44)
MCH RBC QN AUTO: 29.5 PG (ref 26–34)
MCHC RBC AUTO-ENTMCNC: 34.7 G/DL (ref 31–37)
MCV RBC AUTO: 85.2 FL (ref 80–100)
MONOCYTES # BLD: 10 % (ref 5–9)
NRBC AUTOMATED: ABNORMAL PER 100 WBC
PDW BLD-RTO: 13.4 % (ref 12.1–15.2)
PLATELET # BLD: 245 K/UL (ref 140–450)
PLATELET ESTIMATE: ABNORMAL
PMV BLD AUTO: ABNORMAL FL (ref 6–12)
POTASSIUM SERPL-SCNC: 3.6 MMOL/L (ref 3.7–5.3)
RBC # BLD: 5.91 M/UL (ref 4.5–5.9)
RBC # BLD: ABNORMAL 10*6/UL
SEG NEUTROPHILS: 59 % (ref 39–75)
SEGMENTED NEUTROPHILS ABSOLUTE COUNT: 5 K/UL (ref 2.1–6.5)
SODIUM BLD-SCNC: 137 MMOL/L (ref 135–144)
TOTAL PROTEIN: 8.2 G/DL (ref 6.4–8.3)
WBC # BLD: 8.4 K/UL (ref 3.5–11)
WBC # BLD: ABNORMAL 10*3/UL

## 2021-08-14 PROCEDURE — 99284 EMERGENCY DEPT VISIT MOD MDM: CPT

## 2021-08-14 PROCEDURE — 83690 ASSAY OF LIPASE: CPT

## 2021-08-14 PROCEDURE — 36415 COLL VENOUS BLD VENIPUNCTURE: CPT

## 2021-08-14 PROCEDURE — 6370000000 HC RX 637 (ALT 250 FOR IP): Performed by: FAMILY MEDICINE

## 2021-08-14 PROCEDURE — 80053 COMPREHEN METABOLIC PANEL: CPT

## 2021-08-14 PROCEDURE — 85025 COMPLETE CBC W/AUTO DIFF WBC: CPT

## 2021-08-14 RX ORDER — HYOSCYAMINE SULFATE 0.12 MG/1
0.25 TABLET SUBLINGUAL EVERY 4 HOURS PRN
Qty: 15 EACH | Refills: 0 | Status: SHIPPED | OUTPATIENT
Start: 2021-08-14 | End: 2022-04-04

## 2021-08-14 RX ORDER — AMOXICILLIN 500 MG/1
500 CAPSULE ORAL ONCE
Status: COMPLETED | OUTPATIENT
Start: 2021-08-14 | End: 2021-08-14

## 2021-08-14 RX ORDER — AMOXICILLIN 500 MG/1
500 CAPSULE ORAL 3 TIMES DAILY
Qty: 30 CAPSULE | Refills: 0 | Status: SHIPPED | OUTPATIENT
Start: 2021-08-14 | End: 2021-08-24

## 2021-08-14 RX ADMIN — HYOSCYAMINE SULFATE 250 MCG: 0.12 TABLET ORAL; SUBLINGUAL at 22:07

## 2021-08-14 RX ADMIN — AMOXICILLIN 500 MG: 500 CAPSULE ORAL at 22:05

## 2021-08-14 NOTE — LETTER
[facility] ED  [address]  Phone: [phone]               August 14, 2021    Patient: Nadya Fraser   YOB: 1990   Date of Visit: 8/14/2021       To Whom It May Concern:    Kim Sanchez was seen and treated in our emergency department on 8/14/2021. He may return to work on 08/17/2021.       Sincerely,       Abigail Calderon RN         Signature:__________________________________

## 2021-08-15 NOTE — ED PROVIDER NOTES
Age of Onset    Diabetes Father     Diabetes Maternal Grandmother     High Cholesterol Maternal Grandmother        SOCIAL HISTORY    Social History     Socioeconomic History    Marital status: Single     Spouse name: None    Number of children: None    Years of education: None    Highest education level: None   Occupational History    None   Tobacco Use    Smoking status: Never Smoker    Smokeless tobacco: Never Used   Substance and Sexual Activity    Alcohol use: No    Drug use: No    Sexual activity: None   Other Topics Concern    None   Social History Narrative    None     Social Determinants of Health     Financial Resource Strain: Low Risk     Difficulty of Paying Living Expenses: Not hard at all   Food Insecurity: No Food Insecurity    Worried About Running Out of Food in the Last Year: Never true    Maureen of Food in the Last Year: Never true   Transportation Needs:     Lack of Transportation (Medical):      Lack of Transportation (Non-Medical):    Physical Activity:     Days of Exercise per Week:     Minutes of Exercise per Session:    Stress:     Feeling of Stress :    Social Connections:     Frequency of Communication with Friends and Family:     Frequency of Social Gatherings with Friends and Family:     Attends Mandaeism Services:     Active Member of Clubs or Organizations:     Attends Club or Organization Meetings:     Marital Status:    Intimate Partner Violence:     Fear of Current or Ex-Partner:     Emotionally Abused:     Physically Abused:     Sexually Abused:        PHYSICAL EXAM    VITAL SIGNS: BP (!) 162/81   Pulse 115   Temp 99.1 °F (37.3 °C) (Oral)   Resp 16   Ht 5' 7\" (1.702 m)   Wt 205 lb (93 kg)   SpO2 99%   BMI 32.11 kg/m²   Constitutional:  Well developed, well nourished, no acute distress, non-toxic appearance   Eyes:  PERRL, conjunctiva normal   HENT:  Atraumatic, external ears normal, nose normal, oropharynx moist.  Infected left lower wisdom mouth 3 times daily for 10 days, Disp-30 capsule, R-0Normal      Hyoscyamine Sulfate SL (LEVSIN/SL) 0.125 MG SUBL Place 0.25 mg under the tongue every 4 hours as needed (Abdominal pain), Disp-15 each, R-0Normal             Follow-up:  Soo Rashid MD  [address]  [phone]    Call in 2 days          Final Impression:   1. Right lower quadrant abdominal pain    2. Elevated liver enzymes    3.  Dental infection               (Please note that portions of this note were completed with a voice recognition program.  Efforts were made to edit the dictations but occasionally words are mis-transcribed.)     Rosey Mayer MD  08/14/21 [id number]

## 2021-08-17 ENCOUNTER — OFFICE VISIT (OUTPATIENT)
Dept: FAMILY MEDICINE CLINIC | Age: 31
End: 2021-08-17
Payer: COMMERCIAL

## 2021-08-17 VITALS
HEART RATE: 100 BPM | DIASTOLIC BLOOD PRESSURE: 90 MMHG | OXYGEN SATURATION: 98 % | WEIGHT: 206 LBS | BODY MASS INDEX: 32.26 KG/M2 | SYSTOLIC BLOOD PRESSURE: 158 MMHG | TEMPERATURE: 97.3 F

## 2021-08-17 DIAGNOSIS — E11.69 TYPE 2 DIABETES MELLITUS WITH OTHER SPECIFIED COMPLICATION, WITHOUT LONG-TERM CURRENT USE OF INSULIN (HCC): ICD-10-CM

## 2021-08-17 DIAGNOSIS — R10.11 RIGHT UPPER QUADRANT PAIN: Primary | ICD-10-CM

## 2021-08-17 DIAGNOSIS — L73.9 ACUTE FOLLICULITIS: ICD-10-CM

## 2021-08-17 DIAGNOSIS — K05.20 ACUTE PERICORONITIS: ICD-10-CM

## 2021-08-17 PROCEDURE — 99214 OFFICE O/P EST MOD 30 MIN: CPT | Performed by: STUDENT IN AN ORGANIZED HEALTH CARE EDUCATION/TRAINING PROGRAM

## 2021-08-17 RX ORDER — LISINOPRIL 10 MG/1
10 TABLET ORAL DAILY
Qty: 90 TABLET | Refills: 1 | Status: CANCELLED | OUTPATIENT
Start: 2021-08-17

## 2021-08-17 RX ORDER — GLIPIZIDE 5 MG/1
5 TABLET ORAL
Qty: 180 TABLET | Refills: 1 | Status: CANCELLED | OUTPATIENT
Start: 2021-08-17

## 2021-08-17 ASSESSMENT — ENCOUNTER SYMPTOMS
BLOOD IN STOOL: 0
SINUS PAIN: 0
BACK PAIN: 0
DIARRHEA: 0
VOMITING: 0
COLOR CHANGE: 1
ABDOMINAL PAIN: 1
WHEEZING: 0
SORE THROAT: 0
ABDOMINAL DISTENTION: 0
COUGH: 0
NAUSEA: 1

## 2021-08-17 NOTE — PATIENT INSTRUCTIONS
Sixto Bryson your gallbladder ultrasound scheduled. Take all your antibiotics for your wisdom tooth infection.  some Bactroban 2% for the spot on your right leg, put it on three times a day for a week. Thank you for coming to see me today! It was wonderful to meet you! Please give me a call if you have any other questions or problems, and I will see you at your next visit! Dr. Caron Contreras:    Selin Ruff may be receiving a survey from [employer] regarding your visit today. Please complete the survey to enable us to provide the highest quality of care to you and your family. If you cannot score us a very good on any question, please call the office to discuss how we could of made your experience a very good one. Thank you.       Clinical Care Team:     Dr. Vivar Masters, [facility]      ClericalTeam:     Kendall Montgomery     [address]

## 2021-08-17 NOTE — PROGRESS NOTES
HPI Notes    Name: Earle Pino  : 1990         Chief Complaint:     Chief Complaint   Patient presents with    ED Follow-up     Patient was in ED two days ago for his gall bladder. He thought he had a fever and he was sweating .  Nausea     He was feeling nauseuous and felt burning on his right flank area.  Medication Refill       History of Present Illness:        HPI    This is a 57-year-old man presenting for follow-up from evaluation in the emergency department on 2021 for right upper quadrant pain. I did review documentation from this emergency department encounter. He had signs and symptoms concerning for biliary colic, as well as nausea and right flank burning sensation. However, our hospital CT scanner was malfunctioning, and he was not able to undergo any advanced imaging. ED physician recommended outpatient gallbladder ultrasound, which is reasonable. He does have a history of elevated LFTs, particularly ALT. Since discharge, he has been following a gallbladder diet. He has had some recurrent RUQ discomfort with meals despite following the diet. He also would like his left lower wisdom tooth examined, he related to the emergency department physician that this area was abscess, is very painful when he chews, and he frequently has a bad taste when the area is squeezed. He would also like to discuss a red lesion on the distal right lower extremity. This is centered around a hair follicle, and has been worse since yesterday when he wore low-cut work boots to work. He also wore low-cut socks. There is not draining any purulent drainage, and is not bleeding.     Past Medical History:     Past Medical History:   Diagnosis Date    ADHD (attention deficit hyperactivity disorder)     Head concussion     6 th grade (football)    Hypertension     Type 2 diabetes mellitus without complication ([facility] [address]       Reviewed all health maintenance requirements and ordered appropriate tests  Health Maintenance Due   Topic Date Due    Varicella vaccine (1 of 2 - 2-dose childhood series) Never done    Pneumococcal 0-64 years Vaccine (1 of 2 - PPSV23) Never done    Diabetic retinal exam  Never done    COVID-19 Vaccine (1) Never done    HIV screen  Never done    Hepatitis B vaccine (1 of 3 - Risk 3-dose series) Never done       Past Surgical History:     No past surgical history on file. Medications:       Prior to Admission medications    Medication Sig Start Date End Date Taking? Authorizing Provider   glipiZIDE (GLUCOTROL) 5 MG tablet Take 1 tablet by mouth 2 times daily (before meals) 4/29/21  Yes Barbi Hernandez MD   SITagliptin (JANUVIA) 50 MG tablet Take 1 tablet by mouth daily 4/29/21  Yes Barbi Hernandez MD   lisinopril (PRINIVIL;ZESTRIL) 10 MG tablet Take 1 tablet by mouth daily 4/29/21  Yes Barbi Hernandez MD   Blood Glucose Monitoring Suppl (BLOOD GLUCOSE MONITOR SYSTEM) w/Device KIT Test sugar BID 10/24/17  Yes Barbi Hernandez MD   Glucose Blood (BLOOD GLUCOSE TEST STRIPS) STRP Test sugar BID 10/24/17  Yes Barbi Hernandez MD   Lancets MISC Test sugar bid 10/24/17  Yes Barbi Hernandez MD   amoxicillin (AMOXIL) 500 MG capsule Take 1 capsule by mouth 3 times daily for 10 days 8/14/21 8/24/21  García Mendieta MD   Hyoscyamine Sulfate SL (LEVSIN/SL) 0.125 MG SUBL Place 0.25 mg under the tongue every 4 hours as needed (Abdominal pain) 8/14/21   García Mendieta MD        Allergies:       Patient has no known allergies. Social History:     Tobacco:    reports that he has never smoked. He has never used smokeless tobacco.  Alcohol:      reports no history of alcohol use. Drug Use:  reports no history of drug use.     Family History:     Family History   Problem Relation Age of Onset    Diabetes Father     Diabetes Maternal Grandmother     High Cholesterol Maternal Grandmother        Review of Systems:         Review of Systems   Constitutional: Negative 08/14/2021    LABALBU 4.8 08/14/2021    BILITOT 0.55 08/14/2021    ALKPHOS 113 08/14/2021    AST 60 08/14/2021     08/14/2021     Lab Results   Component Value Date    WBC 8.4 08/14/2021    RBC 5.91 08/14/2021    HGB 17.5 08/14/2021    HCT 50.3 08/14/2021    MCV 85.2 08/14/2021    MCH 29.5 08/14/2021    MCHC 34.7 08/14/2021    RDW 13.4 08/14/2021     08/14/2021    MPV NOT REPORTED 08/14/2021     Lab Results   Component Value Date    TSH 0.95 06/10/2021     Lab Results   Component Value Date    CHOL 134 10/28/2020    HDL 28 10/28/2020    LABA1C 6.2 04/29/2021          Assessment & Plan        Diagnosis Orders   1. Right upper quadrant pain  US GALLBLADDER RUQ   2. Acute pericoronitis     3. Acute folliculitis     4. Type 2 diabetes mellitus with other specified complication, without long-term current use of insulin ([facility] [address]         1. Agree with history, this sounds very suspicious for biliary colic, will order outpatient right upper quadrant ultrasound. 2.  Patient has been prescribed 3 times daily amoxicillin for 10 days, he also is going to call his dentist to discuss wisdom tooth extraction. 3.  This rash is consistent with folliculitis, would recommend using over-the-counter Bactroban 2% 3 times daily for 7 days. Follow-up if not improved by next well visit at 11/1/2021. Completed Refills   Requested Prescriptions      No prescriptions requested or ordered in this encounter     No follow-ups on file. No orders of the defined types were placed in this encounter. Orders Placed This Encounter   Procedures    US GALLBLADDER RUQ     This procedure can be scheduled via [employer]. Access your [employer] account by visiting [email or web address].      Standing Status:   Future     Standing Expiration Date:   8/17/2022     Order Specific Question:   Reason for exam:     Answer:   RUQ pain consistent with biliary colic         Patient Instructions   Jonah Hunter,  Get your gallbladder ultrasound

## 2021-08-26 ENCOUNTER — HOSPITAL ENCOUNTER (OUTPATIENT)
Dept: ULTRASOUND IMAGING | Age: 31
Discharge: HOME | End: 2021-08-28
Payer: COMMERCIAL

## 2021-08-26 DIAGNOSIS — R10.11 RIGHT UPPER QUADRANT PAIN: ICD-10-CM

## 2021-08-26 PROCEDURE — 76705 ECHO EXAM OF ABDOMEN: CPT

## 2021-09-23 ENCOUNTER — TELEPHONE (OUTPATIENT)
Dept: FAMILY MEDICINE CLINIC | Age: 31
End: 2021-09-23

## 2021-09-23 NOTE — LETTER
[facility] [address] [place] [id number]  Phone: [phone]  Fax: [phone] Shruthi Marin MD        September 23, 2021     Patient: Berna Mc   YOB: 1990   Date of Visit: 9/23/2021       To Whom it May Concern:    Risa Barahona left work early on 9/18/21 due to a medical condition. He may return to work on 9/19/21. If you have any questions or concerns, please don't hesitate to call.     Sincerely,           Susy Izquierdo MD

## 2021-09-23 NOTE — TELEPHONE ENCOUNTER
Tito Kilgore took a rapid COVID test he got from [employer] on 9/18. He left work because he felt like he was running a fever. He said what had happened he had an infected wisdom tooth. He believes that's why he had the fever. The COVID test was negative but work is going to point him for leaving work early. He is wanting to know if he can just get an excuse for 9/18 leaving early. Please let Tito Rash know. Health Maintenance   Topic Date Due    Varicella vaccine (1 of 2 - 2-dose childhood series) Never done    Pneumococcal 0-64 years Vaccine (1 of 2 - PPSV23) Never done    Diabetic retinal exam  Never done    COVID-19 Vaccine (1) Never done    HIV screen  Never done    Hepatitis B vaccine (1 of 3 - Risk 3-dose series) Never done    Flu vaccine (1) 09/01/2021    Diabetic microalbuminuria test  10/28/2021    Lipid screen  10/28/2021    Diabetic foot exam  04/29/2022    A1C test (Diabetic or Prediabetic)  04/29/2022    Potassium monitoring  08/14/2022    Creatinine monitoring  08/14/2022    DTaP/Tdap/Td vaccine (2 - Td or Tdap) 10/25/2030    Hepatitis C screen  Completed    Hepatitis A vaccine  Aged Out    Hib vaccine  Aged Out    Meningococcal (ACWY) vaccine  Aged Out             (applicable per patient's age: Cancer Screenings, Depression Screening, Fall Risk Screening, Immunizations)    Hemoglobin A1C (%)   Date Value   04/29/2021 6.2   10/28/2020 6.3 (H)   04/21/2020 7.1     Microalb/Crt.  Ratio (mcg/mg creat)   Date Value   10/28/2020 7     LDL Cholesterol (mg/dL)   Date Value   10/28/2020 56     AST (U/L)   Date Value   08/14/2021 60 (H)     ALT (U/L)   Date Value   08/14/2021 177 (H)     BUN (mg/dL)   Date Value   08/14/2021 15      (goal A1C is < 7)   (goal LDL is <100) need 30-50% reduction from baseline     BP Readings from Last 3 Encounters:   08/17/21 (!) 158/90   08/14/21 (!) 162/81   06/10/21 130/70    (goal /80)      All Future Testing planned in [facility]:  Lab Frequency Next Occurrence Lipid Panel Once 10/29/2021   Microalbumin, Ur Once 10/29/2021   Comprehensive Metabolic Panel Once 38/69/1780   Hemoglobin A1C Once 10/29/2021       Next Visit Date:  Future Appointments   Date Time Provider Yovani Martinez   11/1/2021  8:20 AM MD José Luis Ashfordh [facility]            Patient Active Problem List:     Attention deficit disorder     Diabetes mellitus ([facility] [address]

## 2021-11-05 DIAGNOSIS — E11.69 TYPE 2 DIABETES MELLITUS WITH OTHER SPECIFIED COMPLICATION, WITHOUT LONG-TERM CURRENT USE OF INSULIN (HCC): ICD-10-CM

## 2021-11-08 RX ORDER — LISINOPRIL 10 MG/1
10 TABLET ORAL DAILY
Qty: 90 TABLET | Refills: 1 | Status: SHIPPED | OUTPATIENT
Start: 2021-11-08 | End: 2022-05-20

## 2021-11-08 RX ORDER — GLIPIZIDE 5 MG/1
5 TABLET ORAL
Qty: 180 TABLET | Refills: 1 | Status: SHIPPED | OUTPATIENT
Start: 2021-11-08 | End: 2022-05-20

## 2021-11-08 NOTE — TELEPHONE ENCOUNTER
Last OV: 8/17/2021 ED  RUQ pain   Last RX:   Next scheduled apt: 12/14/2021        Sure scripts request      RX pending

## 2021-11-30 ENCOUNTER — OFFICE VISIT (OUTPATIENT)
Dept: PRIMARY CARE CLINIC | Age: 31
End: 2021-11-30
Payer: COMMERCIAL

## 2021-11-30 ENCOUNTER — HOSPITAL ENCOUNTER (OUTPATIENT)
Dept: PREADMISSION TESTING | Age: 31
Setting detail: SPECIMEN
Discharge: HOME | End: 2021-11-30
Payer: COMMERCIAL

## 2021-11-30 VITALS
WEIGHT: 210 LBS | BODY MASS INDEX: 32.96 KG/M2 | HEART RATE: 95 BPM | SYSTOLIC BLOOD PRESSURE: 129 MMHG | DIASTOLIC BLOOD PRESSURE: 85 MMHG | OXYGEN SATURATION: 99 % | HEIGHT: 67 IN | TEMPERATURE: 98.1 F

## 2021-11-30 DIAGNOSIS — J40 BRONCHITIS: Primary | ICD-10-CM

## 2021-11-30 DIAGNOSIS — R05.9 COUGH: ICD-10-CM

## 2021-11-30 DIAGNOSIS — R09.81 SINUS CONGESTION: ICD-10-CM

## 2021-11-30 DIAGNOSIS — R68.83 CHILLS: ICD-10-CM

## 2021-11-30 DIAGNOSIS — R09.89 RUNNY NOSE: ICD-10-CM

## 2021-11-30 PROCEDURE — U0003 INFECTIOUS AGENT DETECTION BY NUCLEIC ACID (DNA OR RNA); SEVERE ACUTE RESPIRATORY SYNDROME CORONAVIRUS 2 (SARS-COV-2) (CORONAVIRUS DISEASE [COVID-19]), AMPLIFIED PROBE TECHNIQUE, MAKING USE OF HIGH THROUGHPUT TECHNOLOGIES AS DESCRIBED BY CMS-2020-01-R: HCPCS

## 2021-11-30 PROCEDURE — U0005 INFEC AGEN DETEC AMPLI PROBE: HCPCS

## 2021-11-30 PROCEDURE — C9803 HOPD COVID-19 SPEC COLLECT: HCPCS

## 2021-11-30 PROCEDURE — 99213 OFFICE O/P EST LOW 20 MIN: CPT | Performed by: NURSE PRACTITIONER

## 2021-11-30 RX ORDER — DOXYCYCLINE 100 MG/1
100 CAPSULE ORAL 2 TIMES DAILY
Qty: 20 CAPSULE | Refills: 0 | Status: SHIPPED | OUTPATIENT
Start: 2021-11-30 | End: 2021-12-10

## 2021-11-30 NOTE — LETTER
[facility] In  [facility] [address]  Phone: Arpit Bloom [phone], APRN - CNP      11/30/2021     Patient: Fredy Shah   YOB: 1990       To Whom It May Concern: It is my medical opinion that Fredy Shah should remain out of school/work while acutely ill and awaiting COVID-19 test results. Return to school/work with no retesting should be followed if test is negative AND meets these criteria as outlined by CDC/ODH:     a. No fever without the use of fever reducers for 24 hours  b. Improvement in symptoms     If tests positive for COVID-19, needs minimum of 10 days strict quarantine, improvement of symptoms and 24 hours fever free without fever reducing medications. If you have any questions or concerns, please don't hesitate to call.     Sincerely,        Marylou Drake, APRN - CNP

## 2021-11-30 NOTE — PATIENT INSTRUCTIONS
Patient Education        Bronchitis: Care Instructions  Your Care Instructions     Bronchitis is inflammation of the bronchial tubes, which carry air to the lungs. The tubes swell and produce mucus, or phlegm. The mucus and inflamed bronchial tubes make you cough. You may have trouble breathing. Most cases of bronchitis are caused by viruses like those that cause colds. Antibiotics usually do not help and they may be harmful. Bronchitis usually develops rapidly and lasts about 2 to 3 weeks in otherwise healthy people. Follow-up care is a key part of your treatment and safety. Be sure to make and go to all appointments, and call your doctor if you are having problems. It's also a good idea to know your test results and keep a list of the medicines you take. How can you care for yourself at home? · Take all medicines exactly as prescribed. Call your doctor if you think you are having a problem with your medicine. · Get some extra rest.  · Take an over-the-counter pain medicine, such as acetaminophen (Tylenol), ibuprofen (Advil, Motrin), or naproxen (Aleve) to reduce fever and relieve body aches. Read and follow all instructions on the label. · Do not take two or more pain medicines at the same time unless the doctor told you to. Many pain medicines have acetaminophen, which is Tylenol. Too much acetaminophen (Tylenol) can be harmful. · Take an over-the-counter cough medicine to help quiet a dry, hacking cough so that you can sleep. Avoid cough medicines that have more than one active ingredient. Read and follow all instructions on the label. · Do not smoke. Smoking can make bronchitis worse. If you need help quitting, talk to your doctor about stop-smoking programs and medicines. These can increase your chances of quitting for good. When should you call for help? Call 911 anytime you think you may need emergency care. For example, call if:    · You have severe trouble breathing.    Call your doctor now or seek immediate medical care if:    · You have new or worse trouble breathing.     · You cough up dark brown or bloody mucus (sputum).     · You have a new or higher fever.     · You have a new rash. Watch closely for changes in your health, and be sure to contact your doctor if:    · You cough more deeply or more often, especially if you notice more mucus or a change in the color of your mucus.     · You are not getting better as expected. Where can you learn more? Go to [email or web address]://[employer]ara.[email or web address] org and sign in to your [employer] account. Enter H333 in the [employer] box to learn more about \"Bronchitis: Care Instructions. \"     If you do not have an account, please click on the \"Sign Up Now\" link. Current as of: July 6, 2021               Content Version: 13.0  © 4200-2577 [employer]. Care instructions adapted under license by [facility] ([facility]). If you have questions about a medical condition or this instruction, always ask your healthcare professional. [address] any warranty or liability for your use of this information. Patient Education        doxycycline (oral/injection)  Pronunciation:  DOX i ASHELY buitrago  Brand:  Acticlate, Adoxa, Alodox, Avidoxy, Doryx, Mondoxyne NL, Monodox, Morgidox, Okebo, Oracea, Oraxyl, Targadox, Vibramycin  What is the most important information I should know about doxycycline? You should not take this medicine if you are allergic to any tetracycline antibiotic. Children younger than 6years old should use doxycycline only in cases of severe or life-threatening conditions. This medicine can cause permanent yellowing or graying of the teeth in children  Using doxycycline during pregnancy could harm the unborn baby or cause permanent tooth discoloration later in the baby's life. What is doxycycline?   Doxycycline is a tetracycline antibiotic that  Doxycycline is used to treat many different bacterial infections, such as acne, urinary tract infections, intestinal infections, eye infections, gonorrhea, chlamydia, periodontitis (gum disease), and others. Doxycycline is also used to treat blemishes, bumps, and acne-like lesions caused by rosacea. Doxycycline will not treat facial redness caused by rosacea. Some forms of doxycycline are used to prevent malaria, to treat anthrax, or to treat infections caused by mites, ticks, or lice. Doxycycline may also be used for purposes not listed in this medication guide. What should I discuss with my healthcare provider before taking doxycycline? You should not take this medicine if you are allergic to doxycycline or other tetracycline antibiotics such as demeclocycline, minocycline, tetracycline, or tigecycline. Tell your doctor if you have ever had:  · liver disease;  · kidney disease;  · asthma or sulfite allergy;  · increased pressure inside your skull; or  · if you also take isotretinoin, seizure medicine, or a blood thinner such as warfarin (Coumadin). If you are using doxycycline to treat gonorrhea, your doctor may test you to make sure you do not also have syphilis, another sexually transmitted disease. Taking this medicine during pregnancy may affect tooth and bone development in the unborn baby. Taking doxycycline during the last half of pregnancy can cause permanent tooth discoloration later in the baby's life. Tell your doctor if you are pregnant or if you become pregnant. Doxycycline can make birth control pills less effective. Ask your doctor about using a non-hormonal birth control (condom, diaphragm with spermicide) to prevent pregnancy. Doxycycline can pass into breast milk and may affect bone and tooth development in a nursing infant. Do not breastfeed while you are taking doxycycline. Doxycycline can cause permanent yellowing or graying of the teeth in children younger than 6years old.  Children should use doxycycline only in cases of severe or life-threatening conditions such as anthrax or [facility] spotted fever. The benefit of treating a serious condition may outweigh any risks to the child's tooth development. How should I take doxycycline? Follow all directions on your prescription label and read all medication guides or instruction sheets. Use the medicine exactly as directed. Take doxycycline with a full glass of water. Drink plenty of liquids while you are taking doxycycline. Read and carefully follow any Instructions for Use provided with your medicine. Ask your doctor or pharmacist if you do not understand these instructions. Most brands of doxycyline may be taken with food or milk if the medicine upsets your stomach. Different brands of doxycycline may have different instructions about taking them with or without food. Take Oracea on an empty stomach, at least 1 hour before or 2 hours after a meal.  You may need to split a doxycycline tablet to get the correct dose. Follow your doctor's instructions. Swallow a delayed-release capsule or tablet whole. Do not crush, chew, break, or open it. Measure liquid medicine  with the dosing syringe provided, or with a special dose-measuring spoon or medicine cup. If you do not have a dose-measuring device, ask your pharmacist for one. If you take doxycycline to prevent malaria: Start taking the medicine 1 or 2 days before entering an area where malaria is common. Continue taking the medicine every day during your stay and for at least 4 weeks after you leave the area. Doxycycline is usually given by injection only if you are unable to take the medicine by mouth. A healthcare provider will give you this injection as an infusion into a vein. Use this medicine for the full prescribed length of time, even if your symptoms quickly improve. Skipping doses can increase your risk of infection that is resistant to medication. Doxycycline will not treat a viral infection such as the flu or a common cold.   Store at room temperature away from moisture, heat, and light. Throw away any unused medicine after the expiration date on the label has passed. Using  doxycycline can cause damage to your kidneys. What happens if I miss a dose? Take the medicine as soon as you can, but skip the missed dose if it is almost time for your next dose. Do not take two doses at one time. What happens if I overdose? Seek emergency medical attention or call the Poison Help line at 1-873.580.4903. What should I avoid while taking doxycycline? Do not take iron supplements, multivitamins, calcium supplements, antacids, or laxatives within 2 hours before or after taking doxycycline. Avoid taking any other antibiotics with doxycycline unless your doctor has told you to. Doxycycline could make you sunburn more easily. Avoid sunlight or tanning beds. Wear protective clothing and use sunscreen (SPF 30 or higher) when you are outdoors. Antibiotic medicines can cause diarrhea, which may be a sign of a new infection. If you have diarrhea that is watery or bloody, call your doctor. Do not use anti-diarrhea medicine unless your doctor tells you to. What are the possible side effects of doxycycline? Get emergency medical help if you have signs of an allergic reaction (hives, difficult breathing, swelling in your face or throat) or a severe skin reaction (fever, sore throat, burning in your eyes, skin pain, red or purple skin rash that spreads and causes blistering and peeling). Seek medical treatment if you have a serious drug reaction that can affect many parts of your body. Symptoms may include: skin rash, fever, swollen glands, flu-like symptoms, muscle aches, severe weakness, unusual bruising, or yellowing of your skin or eyes. This reaction may occur several weeks after you began using doxycycline.   Call your doctor at once if you have:  · severe stomach pain, diarrhea that is watery or bloody;  · throat irritation, trouble swallowing;  · chest pain, irregular heart rhythm, feeling short of breath;  · little or no urination;  · low white blood cell counts --fever, chills, swollen glands, body aches, weakness, pale skin, easy bruising or bleeding;  · increased pressure inside the skull --severe headaches, ringing in your ears, dizziness, nausea, vision problems, pain behind your eyes; or  · signs of liver or pancreas problems --loss of appetite, upper stomach pain (that may spread to your back), tiredness, nausea or vomiting, fast heart rate, dark urine, jaundice (yellowing of the skin or eyes). Common side effects may include:  · nausea, vomiting, upset stomach, loss of appetite;  · mild diarrhea;  · skin rash or itching;  · darkened skin color; or  · vaginal itching or discharge. This is not a complete list of side effects and others may occur. Call your doctor for medical advice about side effects. You may report side effects to FDA at 2-731-FDA-1712. What other drugs will affect doxycycline? Sometimes it is not safe to use certain medications at the same time. Some drugs can affect your blood levels of other drugs you take, which may increase side effects or make the medications less effective. Other drugs may affect doxycycline, including prescription and over-the-counter medicines, vitamins, and herbal products. Tell your doctor about all your current medicines and any medicine you start or stop using. Where can I get more information? Your pharmacist can provide more information about doxycycline. Remember, keep this and all other medicines out of the reach of children, never share your medicines with others, and use this medication only for the indication prescribed. Every effort has been made to ensure that the information provided by [address] is accurate, up-to-date, and complete, but no guarantee is made to that effect. Drug information contained herein may be time sensitive.  [employer] information has been compiled for use by healthcare practitioners and consumers in the [facility] and therefore [facility] does not warrant that uses outside of the [facility] are appropriate, unless specifically indicated otherwise. [facility]'s drug information does not endorse drugs, diagnose patients or recommend therapy. [facility]'s drug information is an informational resource designed to assist licensed healthcare practitioners in caring for their patients and/or to serve consumers viewing this service as a supplement to, and not a substitute for, the expertise, skill, knowledge and judgment of healthcare practitioners. The absence of a warning for a given drug or drug combination in no way should be construed to indicate that the drug or drug combination is safe, effective or appropriate for any given patient. [facility] does not assume any responsibility for any aspect of healthcare administered with the aid of information [facility] provides. The information contained herein is not intended to cover all possible uses, directions, precautions, warnings, drug interactions, allergic reactions, or adverse effects. If you have questions about the drugs you are taking, check with your doctor, nurse or pharmacist.  Copyright 8244-1589 [address]: 21.04. Revision date: 11/4/2020. Care instructions adapted under license by [facility] ([facility]). If you have questions about a medical condition or this instruction, always ask your healthcare professional. [place] [address] any warranty or liability for your use of this information. Patient Education        Learning About Coronavirus [phone])  What is coronavirus (COVID-19)? COVID-19 is a disease caused by a type of coronavirus. This illness was first found in December 2019. It has since spread worldwide. Coronaviruses are a large group of viruses. They cause the common cold.  They also cause more serious illnesses like [place] respiratory syndrome (MERS) and severe acute respiratory syndrome (SARS). COVID-19 is caused by a novel coronavirus. That means it's a new type that has not been seen in people before. What are the symptoms? COVID-19 symptoms may include:  · Fever. · Cough. · Trouble breathing. · Chills or repeated shaking with chills. · Muscle and body aches. · Headache. · Sore throat. · New loss of taste or smell. · Vomiting. · Diarrhea. In severe cases, COVID-19 can cause pneumonia and make it hard to breathe without help from a machine. It can cause death. How is it diagnosed? COVID-19 is diagnosed with a viral test. This may also be called a PCR test or antigen test. It looks for evidence of the virus in your breathing passages or lungs (respiratory system). The test is most often done on a sample from the nose, throat, or lungs. It's sometimes done on a sample of saliva. One way a sample is collected is by putting a long swab into the back of your nose. How is it treated? Mild cases of COVID-19 can be treated at home. Serious cases need treatment in the hospital. Treatment may include medicines to reduce symptoms, plus breathing support such as oxygen therapy or a ventilator. Some people may be placed on their belly to help their oxygen levels. Treatments that may help people who have COVID-19 include:  Antiviral medicines. These medicines treat viral infections. Remdesivir is an example. Immune-based therapy. These medicines help the immune system fight COVID-19. Examples include monoclonal antibodies. Blood thinners. These medicines help prevent blood clots. People with severe illness are at risk for blood clots. How can you protect yourself and others? The best way to protect yourself from getting sick is to:  · Get vaccinated. · Avoid sick people. · If you are not fully vaccinated:  ? Wear a mask if you have to go to public areas. ? Avoid crowds and try to stay at least 6 feet away from other people.   · Cover away.  · Wash your hands often, especially after you cough or sneeze. Use soap and water, and scrub for at least 20 seconds. If soap and water aren't available, use an alcohol-based hand . · Don't share personal household items. These include bedding, towels, cups and glasses, and eating utensils. · [address] [facility] [address] in the warmest water allowed for the fabric type, and dry it completely. It's okay to wash other people's laundry with yours. · Clean and disinfect your home. Use household  and disinfectant wipes or sprays. When should you call for help? Call 911 anytime you think you may need emergency care. For example, call if you have life-threatening symptoms, such as:    · You have severe trouble breathing. (You can't talk at all.)     · You have constant chest pain or pressure.     · You are severely dizzy or lightheaded.     · You are confused or can't think clearly.     · You have pale, gray, or blue-colored skin or lips.     · You pass out (lose consciousness) or are very hard to wake up. Call your doctor now or seek immediate medical care if:    · You have moderate trouble breathing. (You can't speak a full sentence.)     · You are coughing up blood (more than about 1 teaspoon).     · You have signs of low blood pressure. These include feeling lightheaded; being too weak to stand; and having cold, pale, clammy skin. Watch closely for changes in your health, and be sure to contact your doctor if:    · Your symptoms get worse.     · You are not getting better as expected.     · You have new or worse symptoms of anxiety, depression, nightmares, or flashbacks. Call before you go to the doctor's office. Follow their instructions. And wear a mask. Current as of: July 1, 2021               Content Version: 13.0  © 2006-2021 [employer]. Care instructions adapted under license by [facility] ([facility]).  If you have questions about a medical condition or this instruction, always ask your healthcare professional. [place] [address] any warranty or liability for your use of this information.

## 2021-11-30 NOTE — PROGRESS NOTES
Chief Complaint:   Sinus Problem (sinus congestion, runny nose, cough  x4 days \"possible sinus infection\")      History of Present Illness   Source of history provided by:  patient. Monique Birch is a 33 y.o. old male with a past medical history of:   Past Medical History:   Diagnosis Date    ADHD (attention deficit hyperactivity disorder)     Head concussion     6 th grade (football)    Hypertension     Type 2 diabetes mellitus without complication ([facility] [address]     Presents to the walk in clinic for evaluation of sinus pressure, nasal congestion, discolored nasal drainage with new onset of blood streaking, subjective fever, bilateral ear pressure, productive cough, chest congestion, and sore throat x 4 days. According to patient, he thought symptoms were improving yesterday but all of a sudden returned and seem to be worsening. Has been taking Mucinex OTC without relief. Denies wheezing, CP, SOB, or GI symptoms. Hx of February 2021 Covid-19 infection. ROS   Unless otherwise stated in this report or unable to obtain because of the patient's clinical or mental status as evidenced by the medical record, this patients's positive and negative responses for Review of Systems, constitutional, psych, eyes, ENT, cardiovascular, respiratory, gastrointestinal, neurological, genitourinary, musculoskeletal, integument systems and systems related to the presenting problem are either stated in the preceding or were not pertinent or were negative for the symptoms and/or complaints related to the medical problem. Past Surgical History:  has no past surgical history on file. Social History:  reports that he has never smoked. He has never used smokeless tobacco. He reports that he does not drink alcohol and does not use drugs. Family History: family history includes Diabetes in his father and maternal grandmother; High Cholesterol in his maternal grandmother.    Allergies: Patient has no known allergies. Physical Exam       VS:  /85   Pulse 95   Temp 98.1 °F (36.7 °C)   Ht 5' 7\" (1.702 m)   Wt 210 lb (95.3 kg)   SpO2 99%   BMI 32.89 kg/m²          Constitutional:  Alert, development consistent with age. Head: No TTP over the maxillary or frontal sinuses. Ears:  Bilateral pinna normal. TMs  without erythema or perforation bilaterally. Canals normal bilaterally without swelling or exudate  Nose:   No congestion of the nasal mucosa. There is  injection to middle turbinates bilaterally. Throat:  No posterior pharyngeal erythema with mild post nasal drip present. No exudate or tonsillar hypertrophy noted. Neck:  Supple. There is no palpable anterior cervical adenopathy. Lungs: Posterior mid fields with rhonchi without wheezes, rales, or rhonchi. Cough is congested. Heart:  Regular rate and rhythm, normal heart sounds, without ectopy, gallops, or rubs. Skin:  Normal turgor. Warm, dry, without visible rash. Neurological:  Alert and oriented. Motor functions intact. Lab / Imaging Results   (All laboratory and radiology results have been personally reviewed by myself)  Labs:  No results found for this visit on 11/30/21. Assessment / Plan     Impression(s):  Charley Zapata was seen today for sinus problem. Diagnoses and all orders for this visit:    Bronchitis  -     doxycycline monohydrate (MONODOX) 100 MG capsule; Take 1 capsule by mouth 2 times daily for 10 days    Cough  -     COVID-19; Future    Sinus congestion  -     COVID-19; Future    Runny nose  -     COVID-19; Future    Chills  -     COVID-19; Future      Charley Zapata is a 33 y.o. male presents to clinic with concern for pneumonia. He is well-appearing, well-hydrated without distress. Reactive, productive cough with fever and rhonchi on lung auscultation, treating empirically with doxycycline; administration/side effects/probiotics discussed.   Due to chronic health history with diabetes, I have discussed and recommended COVID-19 testing and discussed monoclonal antibodies if positive. Encouraged home quarantine until Covid results have returned. This office will call with results. ED for any worsening or concerns. Dannial Dance, APRN - CNP    This visit was provided as a focused evaluation during the [place] -19 pandemic/national emergency. A comprehensive review of all previous patient history and testing was not conducted. Pertinent findings were elicited during the visit.

## 2021-12-01 LAB
SARS-COV-2: ABNORMAL
SARS-COV-2: DETECTED
SOURCE: ABNORMAL

## 2021-12-14 ENCOUNTER — OFFICE VISIT (OUTPATIENT)
Dept: FAMILY MEDICINE CLINIC | Age: 31
End: 2021-12-14
Payer: COMMERCIAL

## 2021-12-14 VITALS
HEART RATE: 94 BPM | OXYGEN SATURATION: 97 % | SYSTOLIC BLOOD PRESSURE: 138 MMHG | BODY MASS INDEX: 33.2 KG/M2 | WEIGHT: 212 LBS | DIASTOLIC BLOOD PRESSURE: 86 MMHG

## 2021-12-14 DIAGNOSIS — E11.69 TYPE 2 DIABETES MELLITUS WITH OTHER SPECIFIED COMPLICATION, WITHOUT LONG-TERM CURRENT USE OF INSULIN (HCC): Primary | ICD-10-CM

## 2021-12-14 DIAGNOSIS — I10 ESSENTIAL HYPERTENSION: ICD-10-CM

## 2021-12-14 DIAGNOSIS — M77.02 MEDIAL EPICONDYLITIS, LEFT: ICD-10-CM

## 2021-12-14 LAB — HBA1C MFR BLD: 8.2 %

## 2021-12-14 PROCEDURE — 99214 OFFICE O/P EST MOD 30 MIN: CPT | Performed by: FAMILY MEDICINE

## 2021-12-14 PROCEDURE — 3052F HG A1C>EQUAL 8.0%<EQUAL 9.0%: CPT | Performed by: FAMILY MEDICINE

## 2021-12-14 PROCEDURE — 83036 HEMOGLOBIN GLYCOSYLATED A1C: CPT | Performed by: FAMILY MEDICINE

## 2021-12-14 ASSESSMENT — ENCOUNTER SYMPTOMS
EYE REDNESS: 0
ABDOMINAL PAIN: 0
TROUBLE SWALLOWING: 0
SHORTNESS OF BREATH: 0
VOMITING: 0
EYE DISCHARGE: 0
NAUSEA: 0
BLOOD IN STOOL: 0
COUGH: 0
CONSTIPATION: 0
DIARRHEA: 0

## 2021-12-14 NOTE — PROGRESS NOTES
HPI Notes    Name: Danika Torres  : 1990        Chief Complaint:     Chief Complaint   Patient presents with    Diabetes    Hypertension    Joint Swelling     patient complains of left elbow pain x3 months, no known injury, patient says it hurts when he lifts something with pain ranging from dull to sharp, has tried ice a couple of times       History of Present Illness:     Danika Torres is a 33 y.o.  male who presents with Diabetes, Hypertension, and Joint Swelling (patient complains of left elbow pain x3 months, no known injury, patient says it hurts when he lifts something with pain ranging from dull to sharp, has tried ice a couple of times)      Diabetes  He presents for his follow-up diabetic visit. He has type 2 diabetes mellitus. There are no hypoglycemic associated symptoms. Pertinent negatives for hypoglycemia include no dizziness, headaches or tremors. There are no diabetic associated symptoms. Pertinent negatives for diabetes include no chest pain and no fatigue. There are no hypoglycemic complications. Risk factors for coronary artery disease include diabetes mellitus, hypertension and male sex. Current diabetic treatment includes oral agent (dual therapy). He is compliant with treatment most of the time. His weight is stable. When asked about meal planning, he reported none. He rarely (Pt was going to the gym but not since July. Pt sold his house and then busy with work, and then got COVID. ) participates in exercise. His home blood glucose trend is increasing steadily (Pt was not eating well --- fast food when he had the COVID few weeks. ). An ACE inhibitor/angiotensin II receptor blocker is being taken. Eye exam is not current. Hypertension  This is a chronic problem. The current episode started more than 1 year ago. The problem is unchanged. The problem is controlled.  Pertinent negatives include no chest pain, headaches, malaise/fatigue, neck pain, palpitations, peripheral edema or shortness of breath. There are no associated agents to hypertension. Risk factors for coronary artery disease include diabetes mellitus and male gender. The current treatment provides significant improvement. Elbow pain, Lt - pt has pain in the Lt inner elbow pain. Pt states pain started about 3mos ago. Pt was on a new job at work where he was pushing and pulling and reaching a lot at work. Pt states pain from dull to sharp. No particular injury. Pt has iced a few times. Pt is states pain is a little better as he is NO longer on that job. Pt states only hurts when doing that particular reaching movements. Past Medical History:     Past Medical History:   Diagnosis Date    ADHD (attention deficit hyperactivity disorder)     Head concussion     6 th grade (football)    Hypertension     Type 2 diabetes mellitus without complication ([facility] [address]       Reviewed all health maintenance requirements and ordered appropriate tests  Health Maintenance Due   Topic Date Due    Varicella vaccine (1 of 2 - 2-dose childhood series) Never done    Pneumococcal 0-64 years Vaccine (1 of 2 - PPSV23) Never done    COVID-19 Vaccine (1) Never done    HIV screen  Never done    Diabetic retinal exam  Never done    Hepatitis B vaccine (1 of 3 - Risk 3-dose series) Never done    Flu vaccine (1) 09/01/2021    Diabetic microalbuminuria test  10/28/2021    Lipid screen  10/28/2021       Past Surgical History:     History reviewed. No pertinent surgical history. Medications:       Prior to Admission medications    Medication Sig Start Date End Date Taking?  Authorizing Provider   SITagliptin (JANUVIA) 50 MG tablet Take 1 tablet by mouth daily 12/14/21  Yes David Carrillo MD   lisinopril (PRINIVIL;ZESTRIL) 10 MG tablet Take 1 tablet by mouth daily 11/8/21  Yes David Carrillo MD   glipiZIDE (GLUCOTROL) 5 MG tablet Take 1 tablet by mouth 2 times daily (before meals) 11/8/21  Yes David Carrillo MD Hyoscyamine Sulfate SL (LEVSIN/SL) 0.125 MG SUBL Place 0.25 mg under the tongue every 4 hours as needed (Abdominal pain)  Patient not taking: Reported on 11/30/2021 8/14/21   Josefa Chawla MD   Blood Glucose Monitoring Suppl (BLOOD GLUCOSE MONITOR SYSTEM) w/Device KIT Test sugar BID 10/24/17   Minna Lama MD   Glucose Blood (BLOOD GLUCOSE TEST STRIPS) STRP Test sugar BID 10/24/17   Minna Lama MD   Lancets MISC Test sugar bid 10/24/17   Minna Lama MD        Allergies:       Patient has no known allergies. Social History:     Tobacco:    reports that he has never smoked. He has never used smokeless tobacco.  Alcohol:      reports no history of alcohol use. Drug Use:  reports no history of drug use. Family History:     Family History   Problem Relation Age of Onset    Diabetes Father     Diabetes Maternal Grandmother     High Cholesterol Maternal Grandmother        Review of Systems:       Review of Systems   Constitutional: Negative for chills, fatigue, fever and malaise/fatigue. HENT: Negative for trouble swallowing. Eyes: Negative for discharge, redness and visual disturbance. Respiratory: Negative for cough and shortness of breath. Cardiovascular: Negative for chest pain and palpitations. Gastrointestinal: Negative for abdominal pain, blood in stool, constipation, diarrhea, nausea and vomiting. Genitourinary: Negative for dysuria and hematuria. Musculoskeletal: Negative for joint swelling and neck pain. Lt elbow pain   Skin: Negative for rash. Neurological: Negative for dizziness, tremors, light-headedness and headaches. Physical Exam:     Physical Exam  Vitals reviewed. Constitutional:       General: He is not in acute distress. Appearance: Normal appearance. He is well-developed. He is not ill-appearing. HENT:      Head: Normocephalic and atraumatic.    Eyes:      Conjunctiva/sclera: Conjunctivae normal.   Neck:      Thyroid: No thyromegaly. Cardiovascular:      Rate and Rhythm: Normal rate and regular rhythm. Heart sounds: No murmur heard. Pulmonary:      Effort: Pulmonary effort is normal. No respiratory distress. Breath sounds: Normal breath sounds. Abdominal:      General: Bowel sounds are normal.      Palpations: Abdomen is soft. Tenderness: There is no abdominal tenderness. Musculoskeletal:      Left elbow: No swelling, deformity or effusion. Normal range of motion. Tenderness present in lateral epicondyle. Cervical back: Neck supple. Skin:     Findings: No rash. Neurological:      Mental Status: He is alert and oriented to person, place, and time. Vitals:  /86   Pulse 94   Wt 212 lb (96.2 kg)   SpO2 97%   BMI 33.20 kg/m²       Data:     Lab Results   Component Value Date     08/14/2021    K 3.6 08/14/2021     08/14/2021    CO2 25 08/14/2021    BUN 15 08/14/2021    CREATININE 1.07 08/14/2021    GLUCOSE 179 08/14/2021    PROT 8.2 08/14/2021    LABALBU 4.8 08/14/2021    BILITOT 0.55 08/14/2021    ALKPHOS 113 08/14/2021    AST 60 08/14/2021     08/14/2021     Lab Results   Component Value Date    WBC 8.4 08/14/2021    RBC 5.91 08/14/2021    HGB 17.5 08/14/2021    HCT 50.3 08/14/2021    MCV 85.2 08/14/2021    MCH 29.5 08/14/2021    MCHC 34.7 08/14/2021    RDW 13.4 08/14/2021     08/14/2021    MPV NOT REPORTED 08/14/2021     Lab Results   Component Value Date    TSH 0.95 06/10/2021     Lab Results   Component Value Date    CHOL 134 10/28/2020    HDL 28 10/28/2020    LABA1C 6.2 04/29/2021          Assessment/Plan:        1. Type 2 diabetes mellitus with other specified complication, without long-term current use of insulin (HCC)  F/U 3mos, in office  HgbA1C. Do daily foot checks and yearly eye exams  Pt get back on DM diet and working out again  Stay on Januvia and glipizide and mamadou 3mos   - POCT glycosylated hemoglobin (Hb A1C)    2.  Essential

## 2022-04-04 ENCOUNTER — OFFICE VISIT (OUTPATIENT)
Dept: FAMILY MEDICINE CLINIC | Age: 32
End: 2022-04-04
Payer: COMMERCIAL

## 2022-04-04 VITALS
DIASTOLIC BLOOD PRESSURE: 90 MMHG | SYSTOLIC BLOOD PRESSURE: 152 MMHG | HEART RATE: 100 BPM | BODY MASS INDEX: 33.52 KG/M2 | WEIGHT: 214 LBS | OXYGEN SATURATION: 98 %

## 2022-04-04 DIAGNOSIS — R03.0 ELEVATED HEART RATE WITH ELEVATED BLOOD PRESSURE WITHOUT DIAGNOSIS OF HYPERTENSION: ICD-10-CM

## 2022-04-04 DIAGNOSIS — R00.9 ELEVATED HEART RATE WITH ELEVATED BLOOD PRESSURE WITHOUT DIAGNOSIS OF HYPERTENSION: ICD-10-CM

## 2022-04-04 DIAGNOSIS — E11.69 TYPE 2 DIABETES MELLITUS WITH OTHER SPECIFIED COMPLICATION, WITHOUT LONG-TERM CURRENT USE OF INSULIN (HCC): Primary | ICD-10-CM

## 2022-04-04 LAB — HBA1C MFR BLD: 8.2 %

## 2022-04-04 PROCEDURE — 99213 OFFICE O/P EST LOW 20 MIN: CPT | Performed by: FAMILY MEDICINE

## 2022-04-04 PROCEDURE — 83036 HEMOGLOBIN GLYCOSYLATED A1C: CPT | Performed by: FAMILY MEDICINE

## 2022-04-04 PROCEDURE — 3052F HG A1C>EQUAL 8.0%<EQUAL 9.0%: CPT | Performed by: FAMILY MEDICINE

## 2022-04-04 RX ORDER — LISINOPRIL 10 MG/1
10 TABLET ORAL DAILY
Qty: 90 TABLET | Refills: 1 | Status: CANCELLED | OUTPATIENT
Start: 2022-04-04

## 2022-04-04 ASSESSMENT — ENCOUNTER SYMPTOMS
SHORTNESS OF BREATH: 0
TROUBLE SWALLOWING: 0
COUGH: 0
NAUSEA: 0
BLOOD IN STOOL: 0
VOMITING: 0
DIARRHEA: 0
EYE DISCHARGE: 0
EYE REDNESS: 0
ABDOMINAL PAIN: 0

## 2022-04-04 NOTE — PROGRESS NOTES
HPI Notes    Name: Jin Fallon  : 1990        Chief Complaint:     Chief Complaint   Patient presents with    Diabetes     21 HgA1C - 8.2 Pt instructed to follow DM closer, start working out again. History of Present Illness:     Jin Fallon is a 33 y.o.  male who presents with Diabetes (21 HgA1C - 8.2 Pt instructed to follow DM closer, start working out again.)      Diabetes  He presents for his follow-up diabetic visit. He has type 2 diabetes mellitus. His disease course has been stable. There are no hypoglycemic associated symptoms. Pertinent negatives for hypoglycemia include no dizziness. Pertinent negatives for diabetes include no chest pain and no fatigue. There are no hypoglycemic complications. Symptoms are stable. Risk factors for coronary artery disease include diabetes mellitus and hypertension. He is compliant with treatment some of the time. His weight is stable. Diabetic current diet: pt has a cousin who is a nutritionist and pt is going to follow or cut out bread and such in his diet. When asked about meal planning, he reported none. Exercise: Pt has not been exercising or going to the gym but he is taking classes and working alot of hours at work. There is no change (hgba1c is same at 8.2) in his home blood glucose trend. An ACE inhibitor/angiotensin II receptor blocker is being taken. Eye exam is not current. Elevated BP - pt has elevated BP today but hasn't taken his medication  -- lisinopril yet. But pt also working and going back to school so busy and not sleeping well.      Past Medical History:     Past Medical History:   Diagnosis Date    ADHD (attention deficit hyperactivity disorder)     Head concussion     6 th grade (football)    Hypertension     Type 2 diabetes mellitus without complication ([facility] [address]       Reviewed all health maintenance requirements and ordered appropriate tests  Health Maintenance Due   Topic Date Due    Varicella vaccine (1 of 2 - 2-dose childhood series) Never done    COVID-19 Vaccine (1) Never done    Pneumococcal 0-64 years Vaccine (1 of 2 - PPSV23) Never done    HIV screen  Never done    Diabetic retinal exam  Never done    Hepatitis B vaccine (1 of 3 - Risk 3-dose series) Never done    Diabetic microalbuminuria test  10/28/2021    Lipid screen  10/28/2021    Depression Screen  04/29/2022       Past Surgical History:     History reviewed. No pertinent surgical history. Medications:       Prior to Admission medications    Medication Sig Start Date End Date Taking? Authorizing Provider   SITagliptin (JANUVIA) 50 MG tablet Take 1 tablet by mouth daily 12/14/21  Yes Fabien Suarez MD   lisinopril (PRINIVIL;ZESTRIL) 10 MG tablet Take 1 tablet by mouth daily 11/8/21  Yes Fabien Suarez MD   glipiZIDE (GLUCOTROL) 5 MG tablet Take 1 tablet by mouth 2 times daily (before meals) 11/8/21  Yes Fabien Suarez MD   Blood Glucose Monitoring Suppl (BLOOD GLUCOSE MONITOR SYSTEM) w/Device KIT Test sugar BID 10/24/17   Fabien Suarez MD   Glucose Blood (BLOOD GLUCOSE TEST STRIPS) STRP Test sugar BID 10/24/17   Fabien Suarez MD   Lancets MISC Test sugar bid 10/24/17   Fabien Suarez MD        Allergies:       Patient has no known allergies. Social History:     Tobacco:    reports that he has never smoked. He has never used smokeless tobacco.  Alcohol:      reports no history of alcohol use. Drug Use:  reports no history of drug use. Family History:     Family History   Problem Relation Age of Onset    Diabetes Father     Diabetes Maternal Grandmother     High Cholesterol Maternal Grandmother        Review of Systems:       Review of Systems   Constitutional: Negative for chills, fatigue and fever. HENT: Negative for trouble swallowing. Eyes: Negative for discharge, redness and visual disturbance. Respiratory: Negative for cough and shortness of breath.     Cardiovascular: Negative for chest pain, palpitations and leg swelling. Gastrointestinal: Negative for abdominal pain, blood in stool, diarrhea, nausea and vomiting. Genitourinary: Negative for dysuria. Skin: Negative for rash. Neurological: Negative for dizziness and facial asymmetry. Physical Exam:     Physical Exam  Vitals reviewed. Constitutional:       General: He is not in acute distress. Appearance: Normal appearance. He is well-developed. He is not ill-appearing. HENT:      Head: Normocephalic and atraumatic. Eyes:      General:         Right eye: No discharge. Left eye: No discharge. Conjunctiva/sclera: Conjunctivae normal.   Neck:      Thyroid: No thyromegaly. Vascular: No carotid bruit. Cardiovascular:      Rate and Rhythm: Normal rate and regular rhythm. Heart sounds: No murmur heard. Pulmonary:      Effort: Pulmonary effort is normal.      Breath sounds: Normal breath sounds. Abdominal:      General: Bowel sounds are normal. There is no distension. Palpations: Abdomen is soft. Tenderness: There is no abdominal tenderness. Musculoskeletal:      Cervical back: Neck supple. Right lower leg: No edema. Left lower leg: No edema. Lymphadenopathy:      Cervical: No cervical adenopathy. Skin:     General: Skin is warm and dry. Findings: No rash. Neurological:      Mental Status: He is alert and oriented to person, place, and time.          Vitals:  BP (!) 152/90   Pulse 100   Wt 214 lb (97.1 kg)   SpO2 98%   BMI 33.52 kg/m²       Data:     Lab Results   Component Value Date     08/14/2021    K 3.6 08/14/2021     08/14/2021    CO2 25 08/14/2021    BUN 15 08/14/2021    CREATININE 1.07 08/14/2021    GLUCOSE 179 08/14/2021    PROT 8.2 08/14/2021    LABALBU 4.8 08/14/2021    BILITOT 0.55 08/14/2021    ALKPHOS 113 08/14/2021    AST 60 08/14/2021     08/14/2021     Lab Results   Component Value Date    WBC 8.4 08/14/2021    RBC 5.91 08/14/2021 HGB 17.5 08/14/2021    HCT 50.3 08/14/2021    MCV 85.2 08/14/2021    MCH 29.5 08/14/2021    MCHC 34.7 08/14/2021    RDW 13.4 08/14/2021     08/14/2021    MPV NOT REPORTED 08/14/2021     Lab Results   Component Value Date    TSH 0.95 06/10/2021     Lab Results   Component Value Date    CHOL 134 10/28/2020    HDL 28 10/28/2020    LABA1C 8.2 04/04/2022          Assessment/Plan:        1. Type 2 diabetes mellitus with other specified complication, without long-term current use of insulin (HCC)  F/U 3mos, prior CMP, Lipids, HgbA1C. Do daily foot checks and yearly eye exams  Pt to improve low carb diet and if still high in 3mos then may need to increase Januvia to 100mg  - POCT glycosylated hemoglobin (Hb A1C)  - Lipid Panel; Future  - Microalbumin, Ur; Future  - Comprehensive Metabolic Panel; Future  - Hemoglobin A1C; Future    2. Elevated heart rate with elevated blood pressure without diagnosis of hypertension  Pt to stay on lisinopril and eat low salt and healthier-- more vegetables in diet. Kendall in 3mos and pt to ck BP at home or outpt. If still high in 3mos then increase the lisinopril to 20mg         Rao Mckinley received counseling on the following healthy behaviors: nutrition and exercise  Reviewed prior labs and health maintenance  Continue current medications, diet and exercise. Discussed use, benefit, and side effects of prescribed medications. Barriers to medication compliance addressed. Patient given educational materials - see patient instructions  Was a self-tracking handout given in paper form or via [employer]? Yes    Requested Prescriptions      No prescriptions requested or ordered in this encounter       All patient questions answered. Patient voiced understanding. Quality Measures    Body mass index is 33.52 kg/m². Elevated. Weight control planned discussed Healthy diet and regular exercise. BP: (!) 152/90 Blood pressure is high.  Treatment plan consists of ck home BP and No treatment change

## 2022-04-26 ENCOUNTER — TELEPHONE (OUTPATIENT)
Dept: FAMILY MEDICINE CLINIC | Age: 32
End: 2022-04-26

## 2022-04-26 NOTE — TELEPHONE ENCOUNTER
[id number] Shoshana Fitzgerald for note that pt missed work --- if continues not feeling well then will need an appt

## 2022-04-26 NOTE — TELEPHONE ENCOUNTER
----- Message from Gisselle Rojas sent at 4/26/2022 10:10 AM EDT -----  Subject: Message to Provider    QUESTIONS  Information for Provider? pt would like a call back from office due to   possible food poisining and pt wants a  note for missed work from last   night third shift.  ---------------------------------------------------------------------------  --------------  Rina Megladis BELTRE  What is the best way for the office to contact you? OK to leave message on   voicemail  Preferred Call Back Phone Number? [phone]  ---------------------------------------------------------------------------  --------------  SCRIPT ANSWERS  Relationship to Patient?  Self

## 2022-05-17 ENCOUNTER — OFFICE VISIT (OUTPATIENT)
Dept: FAMILY MEDICINE CLINIC | Age: 32
End: 2022-05-17
Payer: COMMERCIAL

## 2022-05-17 VITALS
OXYGEN SATURATION: 97 % | WEIGHT: 214 LBS | BODY MASS INDEX: 33.52 KG/M2 | DIASTOLIC BLOOD PRESSURE: 80 MMHG | SYSTOLIC BLOOD PRESSURE: 164 MMHG

## 2022-05-17 DIAGNOSIS — J06.9 VIRAL URI: Primary | ICD-10-CM

## 2022-05-17 PROCEDURE — 99213 OFFICE O/P EST LOW 20 MIN: CPT | Performed by: NURSE PRACTITIONER

## 2022-05-17 ASSESSMENT — PATIENT HEALTH QUESTIONNAIRE - PHQ9
SUM OF ALL RESPONSES TO PHQ QUESTIONS 1-9: 0
SUM OF ALL RESPONSES TO PHQ QUESTIONS 1-9: 0
2. FEELING DOWN, DEPRESSED OR HOPELESS: 0
SUM OF ALL RESPONSES TO PHQ QUESTIONS 1-9: 0
SUM OF ALL RESPONSES TO PHQ9 QUESTIONS 1 & 2: 0
1. LITTLE INTEREST OR PLEASURE IN DOING THINGS: 0
SUM OF ALL RESPONSES TO PHQ QUESTIONS 1-9: 0

## 2022-05-17 ASSESSMENT — ENCOUNTER SYMPTOMS
SORE THROAT: 1
DIARRHEA: 0
COUGH: 1
SHORTNESS OF BREATH: 0
VOMITING: 0
NAUSEA: 0

## 2022-05-17 NOTE — LETTER
[address]  [place] [id number]  Phone: [phone]  Fax: Darlene Guillermo [phone], APRN - CNP        May 17, 2022    Tico Avendaño  [address]      Dear Ric De La Torre:    Coricidin HBP Cold and Cough 1 tab every 6 hours as needed (nasal decongestant and antihistamine)  Flonase 1 spray each nostril twice daily (nasal steroid)  Ibuprofen 3 times a day (antiinflammatory)  Warm tea with 1tbsp honey (soothes the throat)  Increase water intake  Rest      If you have any questions or concerns, please don't hesitate to call.     Sincerely,          Brandie Cisse, APRN - CNP

## 2022-05-17 NOTE — PROGRESS NOTES
HPI Notes    Name: Camilo Rojas  : 1990         Chief Complaint:     Chief Complaint   Patient presents with    URI     Patient complains of runny nose, cough, fever. Started about 3 days ago. Needs work slip       History of Present Illness:        URI   This is a new problem. The current episode started in the past 7 days. The problem has been gradually improving. The maximum temperature recorded prior to his arrival was 100.4 - 100.9 F. The fever has been present for less than 1 day. Associated symptoms include congestion, coughing and a sore throat. Pertinent negatives include no chest pain, diarrhea, headaches, nausea or vomiting. He has tried acetaminophen (cough medicine) for the symptoms. Past Medical History:     Past Medical History:   Diagnosis Date    ADHD (attention deficit hyperactivity disorder)     Head concussion     6 th grade (football)    Hypertension     Type 2 diabetes mellitus without complication ([facility] [address]       Reviewed all health maintenance requirements and ordered appropriate tests  Health Maintenance Due   Topic Date Due    Varicella vaccine (1 of 2 - 2-dose childhood series) Never done    COVID-19 Vaccine (1) Never done    Pneumococcal 0-64 years Vaccine (1 - PCV) Never done    HIV screen  Never done    Diabetic retinal exam  Never done    Hepatitis B vaccine (1 of 3 - Risk 3-dose series) Never done    Diabetic microalbuminuria test  10/28/2021    Lipids  10/28/2021    Diabetic foot exam  2022       Past Surgical History:     No past surgical history on file. Medications:       Prior to Admission medications    Medication Sig Start Date End Date Taking?  Authorizing Provider   SITagliptin (JANUVIA) 50 MG tablet Take 1 tablet by mouth daily 21  Yes Padmini Amador MD   lisinopril (PRINIVIL;ZESTRIL) 10 MG tablet Take 1 tablet by mouth daily 21  Yes Padmini Amador MD   glipiZIDE (GLUCOTROL) 5 MG tablet Take 1 tablet by mouth 2 times daily (before meals) 11/8/21  Yes Vasyl Demarco MD   Blood Glucose Monitoring Suppl (BLOOD GLUCOSE MONITOR SYSTEM) w/Device KIT Test sugar BID 10/24/17  Yes Vasyl Demarco MD   Glucose Blood (BLOOD GLUCOSE TEST STRIPS) STRP Test sugar BID 10/24/17  Yes Vasly Demarco MD   Lancets MISC Test sugar bid 10/24/17  Yes Vasyl Demarco MD        Allergies:       Patient has no known allergies. Social History:     Tobacco:    reports that he has never smoked. He has never used smokeless tobacco.  Alcohol:      reports no history of alcohol use. Drug Use:  reports no history of drug use. Family History:     Family History   Problem Relation Age of Onset    Diabetes Father     Diabetes Maternal Grandmother     High Cholesterol Maternal Grandmother        Review of Systems:         Review of Systems   Constitutional: Negative for chills and fever. HENT: Positive for congestion and sore throat. Respiratory: Positive for cough. Negative for shortness of breath. Cardiovascular: Negative for chest pain and palpitations. Gastrointestinal: Negative for diarrhea, nausea and vomiting. Neurological: Negative for dizziness, seizures and headaches. Physical Exam:     Vitals:  BP (!) 164/80   Wt 214 lb (97.1 kg)   SpO2 97%   BMI 33.52 kg/m²       Physical Exam  Vitals and nursing note reviewed. Constitutional:       Appearance: He is well-developed. HENT:      Right Ear: Tympanic membrane normal.      Left Ear: Tympanic membrane normal.      Nose: Mucosal edema present. Mouth/Throat:      Pharynx: Posterior oropharyngeal erythema present. Cardiovascular:      Rate and Rhythm: Normal rate and regular rhythm. Heart sounds: Normal heart sounds. Pulmonary:      Effort: Pulmonary effort is normal. No respiratory distress. Breath sounds: Normal breath sounds. Neurological:      Mental Status: He is alert. Psychiatric:         Behavior: Behavior is cooperative. Data:     Lab Results   Component Value Date     08/14/2021    K 3.6 08/14/2021     08/14/2021    CO2 25 08/14/2021    BUN 15 08/14/2021    CREATININE 1.07 08/14/2021    GLUCOSE 179 08/14/2021    PROT 8.2 08/14/2021    LABALBU 4.8 08/14/2021    BILITOT 0.55 08/14/2021    ALKPHOS 113 08/14/2021    AST 60 08/14/2021     08/14/2021     Lab Results   Component Value Date    WBC 8.4 08/14/2021    RBC 5.91 08/14/2021    HGB 17.5 08/14/2021    HCT 50.3 08/14/2021    MCV 85.2 08/14/2021    MCH 29.5 08/14/2021    MCHC 34.7 08/14/2021    RDW 13.4 08/14/2021     08/14/2021    MPV NOT REPORTED 08/14/2021     Lab Results   Component Value Date    TSH 0.95 06/10/2021     Lab Results   Component Value Date    CHOL 134 10/28/2020    HDL 28 10/28/2020    LABA1C 8.2 04/04/2022          Assessment & Plan        Diagnosis Orders   1. Viral URI       Coricidin HBP Cold and Cough 1 tab every 6 hours as needed (nasal decongestant and antihistamine)  Flonase 1 spray each nostril twice daily (nasal steroid)  Ibuprofen 3 times a day (antiinflammatory)  Zinc Sulfate 50mg Daily  Vitamin C 1000mg Daily  Vitamin D3 2000IU Daily   Warm tea with 1tbsp honey (soothes the throat)  Increase water intake  Rest                  Completed Refills   Requested Prescriptions      No prescriptions requested or ordered in this encounter     No follow-ups on file. No orders of the defined types were placed in this encounter. No orders of the defined types were placed in this encounter. Patient Instructions   SURVEY:    You may be receiving a survey from [employer] regarding your visit today. Please complete the survey to enable us to provide the highest quality of care to you and your family. If you cannot score us a very good (5 Stars) on any question, please call the office to discuss how we could have made your experience a very good one. Thank you.     Clinical Care Team: Diana Eldridge LARRY Mcogwan LPN    Clerical Team: [facility]        Electronically signed by CINDY Orozco CNP on 5/17/2022 at 12:52 PM           Completed Refills   Requested Prescriptions      No prescriptions requested or ordered in this encounter

## 2022-05-17 NOTE — PATIENT INSTRUCTIONS
SURVEY:    You may be receiving a survey from [employer] regarding your visit today. Please complete the survey to enable us to provide the highest quality of care to you and your family. If you cannot score us a very good (5 Stars) on any question, please call the office to discuss how we could have made your experience a very good one. Thank you.     Clinical Care Team: CINDY Daniels-SIENA Richard LPN    Clerical Team: [address] Frankey Filbert

## 2022-05-17 NOTE — LETTER
[facility] [address] [place] [id number]  Phone: [phone]  Fax: Darlene Guillermo [phone], APRN - CNP        May 17, 2022     Patient: Darryl Walls   YOB: 1990   Date of Visit: 5/17/2022       To Whom It May Concern: It is my medical opinion that Owen Dan may return to work on 5/19/2022. Pt was off 5/17 and 5/18 due to illness. If you have any questions or concerns, please don't hesitate to call.     Sincerely,          Markell Sanchez, CINDY - CNP

## 2022-05-19 DIAGNOSIS — E11.69 TYPE 2 DIABETES MELLITUS WITH OTHER SPECIFIED COMPLICATION, WITHOUT LONG-TERM CURRENT USE OF INSULIN (HCC): ICD-10-CM

## 2022-05-20 RX ORDER — LISINOPRIL 10 MG/1
10 TABLET ORAL DAILY
Qty: 90 TABLET | Refills: 1 | Status: SHIPPED | OUTPATIENT
Start: 2022-05-20 | End: 2022-05-23 | Stop reason: SDUPTHER

## 2022-05-20 RX ORDER — GLIPIZIDE 5 MG/1
TABLET ORAL
Qty: 180 TABLET | Refills: 1 | Status: SHIPPED | OUTPATIENT
Start: 2022-05-20 | End: 2022-05-23 | Stop reason: SDUPTHER

## 2022-05-23 DIAGNOSIS — E11.69 TYPE 2 DIABETES MELLITUS WITH OTHER SPECIFIED COMPLICATION, WITHOUT LONG-TERM CURRENT USE OF INSULIN (HCC): ICD-10-CM

## 2022-05-23 RX ORDER — LISINOPRIL 10 MG/1
10 TABLET ORAL DAILY
Qty: 90 TABLET | Refills: 1 | Status: SHIPPED | OUTPATIENT
Start: 2022-05-23 | End: 2022-07-06

## 2022-05-23 RX ORDER — GLIPIZIDE 5 MG/1
TABLET ORAL
Qty: 180 TABLET | Refills: 1 | Status: SHIPPED | OUTPATIENT
Start: 2022-05-23 | End: 2022-09-13 | Stop reason: SDUPTHER

## 2022-07-05 ENCOUNTER — HOSPITAL ENCOUNTER (OUTPATIENT)
Age: 32
Discharge: HOME | End: 2022-07-05
Payer: COMMERCIAL

## 2022-07-05 DIAGNOSIS — E11.69 TYPE 2 DIABETES MELLITUS WITH OTHER SPECIFIED COMPLICATION, WITHOUT LONG-TERM CURRENT USE OF INSULIN (HCC): ICD-10-CM

## 2022-07-05 LAB
ALBUMIN SERPL-MCNC: 4.9 G/DL (ref 3.5–5.2)
ALP BLD-CCNC: 106 U/L (ref 40–129)
ALT SERPL-CCNC: 134 U/L (ref 5–41)
ANION GAP SERPL CALCULATED.3IONS-SCNC: 12 MMOL/L (ref 9–17)
AST SERPL-CCNC: 50 U/L
BILIRUB SERPL-MCNC: 0.46 MG/DL (ref 0.3–1.2)
BUN BLDV-MCNC: 16 MG/DL (ref 6–20)
BUN/CREAT BLD: 16 (ref 9–20)
CALCIUM SERPL-MCNC: 10.2 MG/DL (ref 8.6–10.4)
CHLORIDE BLD-SCNC: 100 MMOL/L (ref 98–107)
CHOLESTEROL/HDL RATIO: 5.7
CHOLESTEROL: 166 MG/DL
CO2: 25 MMOL/L (ref 20–31)
CREAT SERPL-MCNC: 1.03 MG/DL (ref 0.7–1.2)
CREATININE URINE: 144.1 MG/DL (ref 39–259)
ESTIMATED AVERAGE GLUCOSE: 183 MG/DL
GFR AFRICAN AMERICAN: >60 ML/MIN
GFR NON-AFRICAN AMERICAN: >60 ML/MIN
GFR SERPL CREATININE-BSD FRML MDRD: ABNORMAL ML/MIN/{1.73_M2}
GLUCOSE BLD-MCNC: 217 MG/DL (ref 70–99)
HBA1C MFR BLD: 8 % (ref 4–6)
HDLC SERPL-MCNC: 29 MG/DL
LDL CHOLESTEROL: 88 MG/DL (ref 0–130)
MICROALBUMIN/CREAT 24H UR: 32 MG/L
MICROALBUMIN/CREAT UR-RTO: 22 MCG/MG CREAT
PATIENT FASTING?: YES
POTASSIUM SERPL-SCNC: 4.4 MMOL/L (ref 3.7–5.3)
SODIUM BLD-SCNC: 137 MMOL/L (ref 135–144)
TOTAL PROTEIN: 7.7 G/DL (ref 6.4–8.3)
TRIGL SERPL-MCNC: 247 MG/DL

## 2022-07-05 PROCEDURE — 82043 UR ALBUMIN QUANTITATIVE: CPT

## 2022-07-05 PROCEDURE — 36415 COLL VENOUS BLD VENIPUNCTURE: CPT

## 2022-07-05 PROCEDURE — 83036 HEMOGLOBIN GLYCOSYLATED A1C: CPT

## 2022-07-05 PROCEDURE — 80053 COMPREHEN METABOLIC PANEL: CPT

## 2022-07-05 PROCEDURE — 80061 LIPID PANEL: CPT

## 2022-07-05 PROCEDURE — 82570 ASSAY OF URINE CREATININE: CPT

## 2022-07-06 ENCOUNTER — OFFICE VISIT (OUTPATIENT)
Dept: FAMILY MEDICINE CLINIC | Age: 32
End: 2022-07-06
Payer: COMMERCIAL

## 2022-07-06 VITALS
HEIGHT: 67 IN | SYSTOLIC BLOOD PRESSURE: 134 MMHG | DIASTOLIC BLOOD PRESSURE: 84 MMHG | OXYGEN SATURATION: 97 % | BODY MASS INDEX: 32.96 KG/M2 | HEART RATE: 90 BPM | WEIGHT: 210 LBS

## 2022-07-06 DIAGNOSIS — I10 PRIMARY HYPERTENSION: ICD-10-CM

## 2022-07-06 DIAGNOSIS — E11.69 TYPE 2 DIABETES MELLITUS WITH OTHER SPECIFIED COMPLICATION, WITHOUT LONG-TERM CURRENT USE OF INSULIN (HCC): ICD-10-CM

## 2022-07-06 PROCEDURE — 99213 OFFICE O/P EST LOW 20 MIN: CPT | Performed by: FAMILY MEDICINE

## 2022-07-06 PROCEDURE — 3052F HG A1C>EQUAL 8.0%<EQUAL 9.0%: CPT | Performed by: FAMILY MEDICINE

## 2022-07-06 RX ORDER — LISINOPRIL 20 MG/1
20 TABLET ORAL DAILY
Qty: 90 TABLET | Refills: 0 | Status: SHIPPED | OUTPATIENT
Start: 2022-07-06 | End: 2022-09-13 | Stop reason: SDUPTHER

## 2022-07-06 SDOH — ECONOMIC STABILITY: FOOD INSECURITY: WITHIN THE PAST 12 MONTHS, YOU WORRIED THAT YOUR FOOD WOULD RUN OUT BEFORE YOU GOT MONEY TO BUY MORE.: NEVER TRUE

## 2022-07-06 SDOH — ECONOMIC STABILITY: FOOD INSECURITY: WITHIN THE PAST 12 MONTHS, THE FOOD YOU BOUGHT JUST DIDN'T LAST AND YOU DIDN'T HAVE MONEY TO GET MORE.: NEVER TRUE

## 2022-07-06 ASSESSMENT — ENCOUNTER SYMPTOMS
DIARRHEA: 0
ABDOMINAL PAIN: 0
VOMITING: 0
CONSTIPATION: 0
COUGH: 0
TROUBLE SWALLOWING: 0
SHORTNESS OF BREATH: 0
NAUSEA: 0
EYE DISCHARGE: 0
EYE REDNESS: 0
BLOOD IN STOOL: 0

## 2022-07-06 ASSESSMENT — SOCIAL DETERMINANTS OF HEALTH (SDOH): HOW HARD IS IT FOR YOU TO PAY FOR THE VERY BASICS LIKE FOOD, HOUSING, MEDICAL CARE, AND HEATING?: NOT HARD AT ALL

## 2022-07-06 NOTE — PATIENT INSTRUCTIONS
Survey: You may be receiving a survey from [employer] regarding your visit today. You may get this in the mail, through your MyChart or in your email. Please complete the survey to enable us to provide the highest quality of care to you and your family. Please also, mention our names. If you cannot score us as very good (5 Stars) on any question, please feel free to call the office to discuss how we could have made your experience exceptional.      Thank You!         MD Maria C Whaley LPN

## 2022-07-06 NOTE — PROGRESS NOTES
HPI Notes    Name: Alex Zelaya  : 1990        Chief Complaint:     Chief Complaint   Patient presents with    Diabetes     3 month check up. Discuss labs.  Hypertension     increased lisinopril from 10 to 20 mg past 2 months. History of Present Illness:     Alex Zelaya is a 33 y.o.  male who presents with Diabetes (3 month check up. Discuss labs. ) and Hypertension (increased lisinopril from 10 to 20 mg past 2 months.)      Diabetes  He presents for his follow-up diabetic visit. He has type 2 diabetes mellitus. There are no hypoglycemic associated symptoms. Pertinent negatives for hypoglycemia include no dizziness or headaches. Pertinent negatives for diabetes include no chest pain and no fatigue. There are no hypoglycemic complications. His weight is decreasing steadily. Diabetic current diet: pt has been trying to get back on the DM diet as her just moved into a new house. His home blood glucose trend is decreasing steadily (Pt's work was hot and giving him popsicles to stay  cooler but they said they were sugar free but turns out they were not sugar free. Pt was eating 2-3 popsicles several days over the couple weeks. ). An ACE inhibitor/angiotensin II receptor blocker is being taken. Eye exam is current. Hypertension  This is a chronic problem. The current episode started more than 1 year ago. The problem has been gradually improving since onset. The problem is controlled (improved with Increased lisinopril from 10 to 20mg ). Pertinent negatives include no chest pain, headaches, malaise/fatigue, neck pain, palpitations, peripheral edema or shortness of breath. There are no associated agents to hypertension. Risk factors for coronary artery disease include male gender and diabetes mellitus. The current treatment provides significant improvement.        Past Medical History:     Past Medical History:   Diagnosis Date    ADHD (attention deficit hyperactivity disorder)     Head concussion     6 th grade (football)    Hypertension     Type 2 diabetes mellitus without complication ([facility] [address]       Reviewed all health maintenance requirements and ordered appropriate tests  Health Maintenance Due   Topic Date Due    Varicella vaccine (1 of 2 - 2-dose childhood series) Never done    COVID-19 Vaccine (1) Never done    Pneumococcal 0-64 years Vaccine (1 - PCV) Never done    HIV screen  Never done    Diabetic retinal exam  Never done    Hepatitis B vaccine (1 of 3 - Risk 3-dose series) Never done    Diabetic foot exam  04/29/2022       Past Surgical History:     History reviewed. No pertinent surgical history. Medications:       Prior to Admission medications    Medication Sig Start Date End Date Taking? Authorizing Provider   lisinopril (PRINIVIL;ZESTRIL) 20 MG tablet Take 1 tablet by mouth daily 7/6/22  Yes Bobbi Brunner MD   SITagliptin (JANUVIA) 50 MG tablet Take 1 tablet by mouth daily 5/23/22  Yes Bobbi Brunner MD   glipiZIDE (GLUCOTROL) 5 MG tablet TAKE 1 TABLET BY MOUTH TWICE DAILY before meals 5/23/22  Yes Bobbi Brunner MD   Blood Glucose Monitoring Suppl (BLOOD GLUCOSE MONITOR SYSTEM) w/Device KIT Test sugar BID 10/24/17   Bobbi Brunner MD   Glucose Blood (BLOOD GLUCOSE TEST STRIPS) STRP Test sugar BID 10/24/17   Bobbi Brunner MD   Lancets MISC Test sugar bid 10/24/17   Bobbi Brunner MD        Allergies:       Patient has no known allergies. Social History:     Tobacco:    reports that he has never smoked. He has never used smokeless tobacco.  Alcohol:      reports no history of alcohol use. Drug Use:  reports no history of drug use. Family History:     Family History   Problem Relation Age of Onset    Diabetes Father     Diabetes Maternal Grandmother     High Cholesterol Maternal Grandmother        Review of Systems:       Review of Systems   Constitutional: Negative for chills, fatigue, fever and malaise/fatigue.    HENT: Negative for trouble swallowing. Eyes: Negative for discharge, redness and visual disturbance. Respiratory: Negative for cough and shortness of breath. Cardiovascular: Negative for chest pain and palpitations. Gastrointestinal: Negative for abdominal pain, blood in stool, constipation, diarrhea, nausea and vomiting. Genitourinary: Negative for dysuria and hematuria. Musculoskeletal: Negative for joint swelling and neck pain. Skin: Negative for rash. Neurological: Negative for dizziness, light-headedness and headaches. Physical Exam:     Physical Exam  Vitals reviewed. Constitutional:       Appearance: He is well-developed. HENT:      Head: Normocephalic and atraumatic. Eyes:      Conjunctiva/sclera: Conjunctivae normal.      Pupils: Pupils are equal, round, and reactive to light. Neck:      Thyroid: No thyromegaly. Cardiovascular:      Rate and Rhythm: Normal rate and regular rhythm. Heart sounds: No murmur heard. Pulmonary:      Effort: Pulmonary effort is normal.      Breath sounds: Normal breath sounds. Abdominal:      General: Bowel sounds are normal.      Palpations: Abdomen is soft. Tenderness: There is no abdominal tenderness. Musculoskeletal:      Cervical back: Neck supple. Skin:     General: Skin is warm and dry. Findings: No rash. Neurological:      Mental Status: He is alert and oriented to person, place, and time.          Vitals:  /84 (Site: Right Upper Arm, Position: Sitting, Cuff Size: Large Adult)   Pulse 90   Ht 5' 7\" (1.702 m)   Wt 210 lb (95.3 kg)   SpO2 97%   BMI 32.89 kg/m²       Data:     Lab Results   Component Value Date/Time     07/05/2022 10:43 AM    K 4.4 07/05/2022 10:43 AM     07/05/2022 10:43 AM    CO2 25 07/05/2022 10:43 AM    BUN 16 07/05/2022 10:43 AM    CREATININE 1.03 07/05/2022 10:43 AM    GLUCOSE 217 07/05/2022 10:43 AM    PROT 7.7 07/05/2022 10:43 AM    LABALBU 4.9 07/05/2022 10:43 AM    BILITOT 0.46 07/05/2022 10:43 AM    ALKPHOS 106 07/05/2022 10:43 AM    AST 50 07/05/2022 10:43 AM     07/05/2022 10:43 AM     Lab Results   Component Value Date/Time    WBC 8.4 08/14/2021 08:16 PM    RBC 5.91 08/14/2021 08:16 PM    HGB 17.5 08/14/2021 08:16 PM    HCT 50.3 08/14/2021 08:16 PM    MCV 85.2 08/14/2021 08:16 PM    MCH 29.5 08/14/2021 08:16 PM    MCHC 34.7 08/14/2021 08:16 PM    RDW 13.4 08/14/2021 08:16 PM     08/14/2021 08:16 PM    MPV NOT REPORTED 08/14/2021 08:16 PM     Lab Results   Component Value Date/Time    TSH 0.95 06/10/2021 01:23 PM     Lab Results   Component Value Date/Time    CHOL 166 07/05/2022 10:43 AM    HDL 29 07/05/2022 10:43 AM    LABA1C 8.0 07/05/2022 10:43 AM          Assessment/Plan:        1. Type 2 diabetes mellitus with other specified complication, without long-term current use of insulin (HCC)  F/U 3mos, stable and ck in office, HgbA1C. Do daily foot checks and yearly eye exams  Stable on current meds and mamadou 3mos with DM diet  - lisinopril (PRINIVIL;ZESTRIL) 20 MG tablet; Take 1 tablet by mouth daily  Dispense: 90 tablet; Refill: 0    2. Primary hypertension  Improved on the zestril 20mg       Charley Zapata received counseling on the following healthy behaviors: nutrition and exercise  Reviewed prior labs and health maintenance  Continue current medications, diet and exercise. Discussed use, benefit, and side effects of prescribed medications. Barriers to medication compliance addressed. Patient given educational materials - see patient instructions  Was a self-tracking handout given in paper form or via [employer]? Yes    Requested Prescriptions     Signed Prescriptions Disp Refills    lisinopril (PRINIVIL;ZESTRIL) 20 MG tablet 90 tablet 0     Sig: Take 1 tablet by mouth daily       All patient questions answered. Patient voiced understanding. Quality Measures    Body mass index is 32.89 kg/m². Elevated. Weight control planned discussed Healthy diet and regular exercise.     BP: 134/84 Blood pressure is normal. Treatment plan consists of No treatment change needed. Lab Results   Component Value Date    LDLCHOLESTEROL 88 07/05/2022    (goal LDL reduction with dx if diabetes is 50% LDL reduction)      PHQ Scores 5/17/2022 4/29/2021 4/21/2020 3/28/2019 12/3/2018 2/14/2018 10/20/2016   PHQ2 Score 0 0 0 0 0 0 0   PHQ9 Score 0 0 0 0 0 0 0     Interpretation of Total Score Depression Severity: 1-4 = Minimal depression, 5-9 = Mild depression, 10-14 = Moderate depression, 15-19 = Moderately severe depression, 20-27 = Severe depression      Return in about 3 months (around 10/6/2022) for DM.       Electronically signed by Jan Rondon MD on 7/6/2022 at 9:48 AM

## 2022-09-13 DIAGNOSIS — E11.69 TYPE 2 DIABETES MELLITUS WITH OTHER SPECIFIED COMPLICATION, WITHOUT LONG-TERM CURRENT USE OF INSULIN (HCC): ICD-10-CM

## 2022-09-13 RX ORDER — GLIPIZIDE 5 MG/1
TABLET ORAL
Qty: 180 TABLET | Refills: 1 | Status: SHIPPED | OUTPATIENT
Start: 2022-09-13 | End: 2022-09-22 | Stop reason: SDUPTHER

## 2022-09-13 RX ORDER — LISINOPRIL 20 MG/1
20 TABLET ORAL DAILY
Qty: 90 TABLET | Refills: 1 | Status: SHIPPED | OUTPATIENT
Start: 2022-09-13 | End: 2022-09-22 | Stop reason: SDUPTHER

## 2022-09-13 NOTE — TELEPHONE ENCOUNTER
Patient has changed jobs so will need new scripts sent to [facility] for Glipizide & Lisinopril    Health Maintenance   Topic Date Due    COVID-19 Vaccine (1) Never done    Varicella vaccine (1 of 2 - 2-dose childhood series) Never done    Pneumococcal 0-64 years Vaccine (1 - PCV) Never done    HIV screen  Never done    Diabetic retinal exam  Never done    Hepatitis B vaccine (1 of 3 - Risk 3-dose series) Never done    Diabetic foot exam  04/29/2022    Flu vaccine (1) 09/01/2022    Depression Screen  05/17/2023    A1C test (Diabetic or Prediabetic)  07/05/2023    Diabetic microalbuminuria test  07/05/2023    Lipids  07/05/2023    DTaP/Tdap/Td vaccine (2 - Td or Tdap) 10/25/2030    Hepatitis C screen  Completed    Hepatitis A vaccine  Aged Out    Hib vaccine  Aged Out    Meningococcal (ACWY) vaccine  Aged Out             (applicable per patient's age: Cancer Screenings, Depression Screening, Fall Risk Screening, Immunizations)    Hemoglobin A1C (%)   Date Value   07/05/2022 8.0 (H)   04/04/2022 8.2   12/14/2021 8.2     Microalb/Crt.  Ratio (mcg/mg creat)   Date Value   07/05/2022 22 (H)     LDL Cholesterol (mg/dL)   Date Value   07/05/2022 88     AST (U/L)   Date Value   07/05/2022 50 (H)     ALT (U/L)   Date Value   07/05/2022 134 (H)     BUN (mg/dL)   Date Value   07/05/2022 16      (goal A1C is < 7)   (goal LDL is <100) need 30-50% reduction from baseline     BP Readings from Last 3 Encounters:   07/06/22 134/84   05/17/22 (!) 164/80   04/04/22 (!) 152/90    (goal /80)      All Future Testing planned in [facility]:  Lab Frequency Next Occurrence       Next Visit Date:  Future Appointments   Date Time Provider Yovani Martinez   10/10/2022  9:20 AM MD Mellissa Wild [facility]            Patient Active Problem List:     Attention deficit disorder     Diabetes mellitus ([facility] [address]     Primary hypertension

## 2022-09-13 NOTE — TELEPHONE ENCOUNTER
Last OV: 7/6/2022 chronic   Last RX:    Next scheduled apt: 10/10/2022 3 month DM       Pt requesting a refill          Pt changed jobs and pharmacies

## 2022-09-22 DIAGNOSIS — E11.69 TYPE 2 DIABETES MELLITUS WITH OTHER SPECIFIED COMPLICATION, WITHOUT LONG-TERM CURRENT USE OF INSULIN (HCC): ICD-10-CM

## 2022-09-22 RX ORDER — GLIPIZIDE 5 MG/1
TABLET ORAL
Qty: 180 TABLET | Refills: 1 | Status: SHIPPED | OUTPATIENT
Start: 2022-09-22

## 2022-09-22 RX ORDER — LISINOPRIL 20 MG/1
20 TABLET ORAL DAILY
Qty: 90 TABLET | Refills: 1 | Status: SHIPPED | OUTPATIENT
Start: 2022-09-22

## 2022-10-10 ENCOUNTER — OFFICE VISIT (OUTPATIENT)
Dept: FAMILY MEDICINE CLINIC | Age: 32
End: 2022-10-10

## 2022-10-10 VITALS
DIASTOLIC BLOOD PRESSURE: 86 MMHG | SYSTOLIC BLOOD PRESSURE: 130 MMHG | HEART RATE: 86 BPM | BODY MASS INDEX: 32.58 KG/M2 | OXYGEN SATURATION: 98 % | WEIGHT: 208 LBS

## 2022-10-10 DIAGNOSIS — E11.69 TYPE 2 DIABETES MELLITUS WITH OTHER SPECIFIED COMPLICATION, WITHOUT LONG-TERM CURRENT USE OF INSULIN (HCC): Primary | ICD-10-CM

## 2022-10-10 LAB — HBA1C MFR BLD: 7.1 %

## 2022-10-10 PROCEDURE — 99213 OFFICE O/P EST LOW 20 MIN: CPT | Performed by: FAMILY MEDICINE

## 2022-10-10 PROCEDURE — 83036 HEMOGLOBIN GLYCOSYLATED A1C: CPT | Performed by: FAMILY MEDICINE

## 2022-10-10 PROCEDURE — 3052F HG A1C>EQUAL 8.0%<EQUAL 9.0%: CPT | Performed by: FAMILY MEDICINE

## 2022-10-10 ASSESSMENT — ENCOUNTER SYMPTOMS
SHORTNESS OF BREATH: 0
BLOOD IN STOOL: 0
EYE REDNESS: 0
COUGH: 0
TROUBLE SWALLOWING: 0
ABDOMINAL PAIN: 0
EYE DISCHARGE: 0
NAUSEA: 0
CONSTIPATION: 0
DIARRHEA: 0
VOMITING: 0

## 2022-10-10 NOTE — PROGRESS NOTES
HPI Notes    Name: Jorge Alberto Iyer  : 1990        Chief Complaint:     Chief Complaint   Patient presents with    Diabetes     Pt presents today for 3 month follow up. 22 No medication changes made. History of Present Illness:     Jorge Alberto Iyer is a 27 y.o.  male who presents with Diabetes (Pt presents today for 3 month follow up. 22 No medication changes made.)      Diabetes  He presents for his follow-up diabetic visit. He has type 2 diabetes mellitus. His disease course has been stable (pt is doing better and has new job at [employer] as an  in [employer]. Pt has more time off and better schedule. ). There are no hypoglycemic associated symptoms. Pertinent negatives for hypoglycemia include no dizziness, headaches or tremors. Pertinent negatives for diabetes include no chest pain and no fatigue. There are no hypoglycemic complications. Risk factors for coronary artery disease include diabetes mellitus and male sex. Current diabetic treatment includes oral agent (dual therapy). He is compliant with treatment most of the time. His weight is decreasing steadily. He is following a generally healthy diet. Diabetic meal planning: pt has been eating better and limting carbs --hgba1c from 8.0 to 7.1. Pt talked to his cousin who is a nutritionist and pt is eating more small frequent meal. Exercise: Pt is back exercising/working out and got some home equipment. Thus pt is feeling better. His home blood glucose trend is decreasing steadily. An ACE inhibitor/angiotensin II receptor blocker is being taken. Eye exam is not current.      Past Medical History:     Past Medical History:   Diagnosis Date    ADHD (attention deficit hyperactivity disorder)     Head concussion     6 th grade (football)    Hypertension     Type 2 diabetes mellitus without complication ([facility] [address]       Reviewed all health maintenance requirements and ordered appropriate tests  Health Maintenance Due   Topic Date Due    COVID-19 Vaccine (1) Never done    Varicella vaccine (1 of 2 - 2-dose childhood series) Never done    Pneumococcal 0-64 years Vaccine (1 - PCV) Never done    HIV screen  Never done    Diabetic retinal exam  Never done    Hepatitis B vaccine (1 of 3 - Risk 3-dose series) Never done    Diabetic foot exam  04/29/2022    Flu vaccine (1) 08/01/2022       Past Surgical History:     History reviewed. No pertinent surgical history. Medications:       Prior to Admission medications    Medication Sig Start Date End Date Taking? Authorizing Provider   glipiZIDE (GLUCOTROL) 5 MG tablet TAKE 1 TABLET BY MOUTH TWICE DAILY before meals 9/22/22  Yes Ky Mcgrath MD   lisinopril (PRINIVIL;ZESTRIL) 20 MG tablet Take 1 tablet by mouth daily 9/22/22  Yes Ky Mcgrath MD   SITagliptin (JANUVIA) 50 MG tablet Take 1 tablet by mouth daily 5/23/22  Yes Ky Mcgrath MD   Blood Glucose Monitoring Suppl (BLOOD GLUCOSE MONITOR SYSTEM) w/Device KIT Test sugar BID 10/24/17   Ky Mcgrath MD   Glucose Blood (BLOOD GLUCOSE TEST STRIPS) STRP Test sugar BID 10/24/17   Ky Mcgrath MD   Lancets MISC Test sugar bid 10/24/17   Ky Mcgrath MD        Allergies:       Patient has no known allergies. Social History:     Tobacco:    reports that he has never smoked. He has never used smokeless tobacco.  Alcohol:      reports no history of alcohol use. Drug Use:  reports no history of drug use. Family History:     Family History   Problem Relation Age of Onset    Diabetes Father     Diabetes Maternal Grandmother     High Cholesterol Maternal Grandmother        Review of Systems:       Review of Systems   Constitutional:  Negative for chills, fatigue and fever. HENT:  Negative for trouble swallowing. Eyes:  Negative for discharge, redness and visual disturbance. Respiratory:  Negative for cough and shortness of breath. Cardiovascular:  Negative for chest pain and palpitations.    Gastrointestinal:  Negative for abdominal pain, blood in stool, constipation, diarrhea, nausea and vomiting. Genitourinary:  Negative for dysuria and hematuria. Musculoskeletal:  Negative for joint swelling and neck pain. Skin:  Negative for rash. Neurological:  Negative for dizziness, tremors, light-headedness and headaches. Physical Exam:     Physical Exam  Vitals reviewed. Constitutional:       General: He is not in acute distress. Appearance: Normal appearance. He is well-developed. He is not ill-appearing. HENT:      Head: Normocephalic and atraumatic. Eyes:      Conjunctiva/sclera: Conjunctivae normal.   Neck:      Thyroid: No thyromegaly. Vascular: No carotid bruit. Cardiovascular:      Rate and Rhythm: Normal rate and regular rhythm. Heart sounds: No murmur heard. Pulmonary:      Effort: Pulmonary effort is normal. No respiratory distress. Breath sounds: Normal breath sounds. Musculoskeletal:      Cervical back: Neck supple. Right lower leg: No edema. Left lower leg: No edema. Skin:     Findings: No rash. Neurological:      Mental Status: He is alert.        Vitals:  /86   Pulse 86   Wt 208 lb (94.3 kg)   SpO2 98%   BMI 32.58 kg/m²       Data:     Lab Results   Component Value Date/Time     07/05/2022 10:43 AM    K 4.4 07/05/2022 10:43 AM     07/05/2022 10:43 AM    CO2 25 07/05/2022 10:43 AM    BUN 16 07/05/2022 10:43 AM    CREATININE 1.03 07/05/2022 10:43 AM    GLUCOSE 217 07/05/2022 10:43 AM    PROT 7.7 07/05/2022 10:43 AM    LABALBU 4.9 07/05/2022 10:43 AM    BILITOT 0.46 07/05/2022 10:43 AM    ALKPHOS 106 07/05/2022 10:43 AM    AST 50 07/05/2022 10:43 AM     07/05/2022 10:43 AM     Lab Results   Component Value Date/Time    WBC 8.4 08/14/2021 08:16 PM    RBC 5.91 08/14/2021 08:16 PM    HGB 17.5 08/14/2021 08:16 PM    HCT 50.3 08/14/2021 08:16 PM    MCV 85.2 08/14/2021 08:16 PM    MCH 29.5 08/14/2021 08:16 PM    MCHC 34.7 08/14/2021 08:16 PM    RDW 13.4 08/14/2021 08:16 PM     08/14/2021 08:16 PM    MPV NOT REPORTED 08/14/2021 08:16 PM     Lab Results   Component Value Date/Time    TSH 0.95 06/10/2021 01:23 PM     Lab Results   Component Value Date/Time    CHOL 166 07/05/2022 10:43 AM    HDL 29 07/05/2022 10:43 AM    LABA1C 8.0 07/05/2022 10:43 AM          Assessment/Plan:        1. Type 2 diabetes mellitus with other specified complication, without long-term current use of insulin (HCC)  F/U 6mos, prior CMP, Lipids, HgbA1C. Do daily foot checks and yearly eye exams  Stable on januvia and glipizide and lisinopril  Pt is doing better with diet and exercise  - POCT glycosylated hemoglobin (Hb A1C)  - Lipid Panel; Future  - Comprehensive Metabolic Panel; Future  - Hemoglobin A1C; Future        Mehdi Janene received counseling on the following healthy behaviors: nutrition and exercise  Reviewed prior labs and health maintenance  Continue current medications, diet and exercise. Discussed use, benefit, and side effects of prescribed medications. Barriers to medication compliance addressed. Patient given educational materials - see patient instructions  Was a self-tracking handout given in paper form or via [employer]? Yes    Requested Prescriptions      No prescriptions requested or ordered in this encounter       All patient questions answered. Patient voiced understanding. Quality Measures    Body mass index is 32.58 kg/m². Elevated. Weight control planned discussed Healthy diet and regular exercise. BP: 130/86 Blood pressure is Normal. Treatment plan consists of No treatment change needed.     Lab Results   Component Value Date    LDLCHOLESTEROL 88 07/05/2022    (goal LDL reduction with dx if diabetes is 50% LDL reduction)      PHQ Scores 5/17/2022 4/29/2021 4/21/2020 3/28/2019 12/3/2018 2/14/2018 10/20/2016   PHQ2 Score 0 0 0 0 0 0 0   PHQ9 Score 0 0 0 0 0 0 0     Interpretation of Total Score Depression Severity: 1-4 = Minimal depression, 5-9 = Mild depression, 10-14 = Moderate depression, 15-19 = Moderately severe depression, 20-27 = Severe depression      Return in about 6 months (around 4/10/2023) for DM.       Electronically signed by Odalis Pablo MD on 10/10/2022 at 9:58 AM

## 2022-11-28 ENCOUNTER — TELEPHONE (OUTPATIENT)
Dept: FAMILY MEDICINE CLINIC | Age: 32
End: 2022-11-28

## 2022-11-28 DIAGNOSIS — E11.69 TYPE 2 DIABETES MELLITUS WITH OTHER SPECIFIED COMPLICATION, WITHOUT LONG-TERM CURRENT USE OF INSULIN (HCC): ICD-10-CM

## 2022-11-28 RX ORDER — LISINOPRIL 20 MG/1
20 TABLET ORAL DAILY
Qty: 90 TABLET | Refills: 1 | Status: SHIPPED | OUTPATIENT
Start: 2022-11-28

## 2022-11-28 RX ORDER — GLIPIZIDE 5 MG/1
TABLET ORAL
Qty: 180 TABLET | Refills: 1 | Status: SHIPPED | OUTPATIENT
Start: 2022-11-28

## 2022-11-28 NOTE — TELEPHONE ENCOUNTER
Last OV: 10/10/2022  Next scheduled apt: 4/10/2023    Pt called needing meds refilled, meds pending approval.      Thank you

## 2023-02-09 NOTE — TELEPHONE ENCOUNTER
Last OV 4/2020 for DM  Requesting refill on glipizide and metformin thru [facility]  Next OV 10/28/20 Cyclophosphamide Pregnancy And Lactation Text: This medication is Pregnancy Category D and it isn't considered safe during pregnancy. This medication is excreted in breast milk.

## 2023-02-21 DIAGNOSIS — E11.69 TYPE 2 DIABETES MELLITUS WITH OTHER SPECIFIED COMPLICATION, WITHOUT LONG-TERM CURRENT USE OF INSULIN (HCC): ICD-10-CM

## 2023-02-22 RX ORDER — LISINOPRIL 10 MG/1
10 TABLET ORAL DAILY
Qty: 90 TABLET | Refills: 1 | OUTPATIENT
Start: 2023-02-22

## 2023-02-22 RX ORDER — GLIPIZIDE 5 MG/1
TABLET ORAL
Qty: 180 TABLET | Refills: 1 | Status: SHIPPED | OUTPATIENT
Start: 2023-02-22

## 2023-02-22 RX ORDER — SITAGLIPTIN 50 MG/1
TABLET, FILM COATED ORAL
Qty: 90 TABLET | Refills: 1 | Status: SHIPPED | OUTPATIENT
Start: 2023-02-22

## 2023-02-22 NOTE — TELEPHONE ENCOUNTER
Last OV: 10/10/2022 chronic   Last RX:    Next scheduled apt: 4/10/2023 6 months           Surescript requesting a refill

## 2023-05-09 ENCOUNTER — HOSPITAL ENCOUNTER (OUTPATIENT)
Age: 33
Discharge: HOME | End: 2023-05-09
Payer: COMMERCIAL

## 2023-05-09 ENCOUNTER — OFFICE VISIT (OUTPATIENT)
Dept: FAMILY MEDICINE CLINIC | Age: 33
End: 2023-05-09
Payer: COMMERCIAL

## 2023-05-09 VITALS
WEIGHT: 210 LBS | BODY MASS INDEX: 32.96 KG/M2 | SYSTOLIC BLOOD PRESSURE: 132 MMHG | OXYGEN SATURATION: 98 % | HEART RATE: 96 BPM | DIASTOLIC BLOOD PRESSURE: 62 MMHG | HEIGHT: 67 IN

## 2023-05-09 DIAGNOSIS — E11.69 TYPE 2 DIABETES MELLITUS WITH OTHER SPECIFIED COMPLICATION, WITHOUT LONG-TERM CURRENT USE OF INSULIN (HCC): ICD-10-CM

## 2023-05-09 DIAGNOSIS — I10 PRIMARY HYPERTENSION: Primary | ICD-10-CM

## 2023-05-09 LAB
ALBUMIN SERPL-MCNC: 4.6 G/DL (ref 3.5–5.2)
ALP SERPL-CCNC: 110 U/L (ref 40–129)
ALT SERPL-CCNC: 211 U/L (ref 5–41)
ANION GAP SERPL CALCULATED.3IONS-SCNC: 10 MMOL/L (ref 9–17)
AST SERPL-CCNC: 62 U/L
BILIRUB SERPL-MCNC: 0.6 MG/DL (ref 0.3–1.2)
BUN SERPL-MCNC: 9 MG/DL (ref 6–20)
BUN/CREAT BLD: 11 (ref 9–20)
CALCIUM SERPL-MCNC: 9.9 MG/DL (ref 8.6–10.4)
CHLORIDE SERPL-SCNC: 100 MMOL/L (ref 98–107)
CHOLEST SERPL-MCNC: 147 MG/DL
CHOLESTEROL/HDL RATIO: 5.1
CO2 SERPL-SCNC: 28 MMOL/L (ref 20–31)
CREAT SERPL-MCNC: 0.83 MG/DL (ref 0.7–1.2)
EST. AVERAGE GLUCOSE BLD GHB EST-MCNC: 237 MG/DL
GFR SERPL CREATININE-BSD FRML MDRD: >60 ML/MIN/1.73M2
GLUCOSE SERPL-MCNC: 180 MG/DL (ref 70–99)
HBA1C MFR BLD: 9.9 % (ref 4–6)
HDLC SERPL-MCNC: 29 MG/DL
LDLC SERPL CALC-MCNC: 68 MG/DL (ref 0–130)
PATIENT FASTING?: YES
POTASSIUM SERPL-SCNC: 4.1 MMOL/L (ref 3.7–5.3)
PROT SERPL-MCNC: 7.5 G/DL (ref 6.4–8.3)
SODIUM SERPL-SCNC: 138 MMOL/L (ref 135–144)
TRIGL SERPL-MCNC: 249 MG/DL

## 2023-05-09 PROCEDURE — 36415 COLL VENOUS BLD VENIPUNCTURE: CPT

## 2023-05-09 PROCEDURE — 99214 OFFICE O/P EST MOD 30 MIN: CPT | Performed by: FAMILY MEDICINE

## 2023-05-09 PROCEDURE — 3075F SYST BP GE 130 - 139MM HG: CPT | Performed by: FAMILY MEDICINE

## 2023-05-09 PROCEDURE — 80061 LIPID PANEL: CPT

## 2023-05-09 PROCEDURE — 3078F DIAST BP <80 MM HG: CPT | Performed by: FAMILY MEDICINE

## 2023-05-09 PROCEDURE — 80053 COMPREHEN METABOLIC PANEL: CPT

## 2023-05-09 PROCEDURE — 83036 HEMOGLOBIN GLYCOSYLATED A1C: CPT

## 2023-05-09 PROCEDURE — 3046F HEMOGLOBIN A1C LEVEL >9.0%: CPT | Performed by: FAMILY MEDICINE

## 2023-05-09 RX ORDER — LISINOPRIL 20 MG/1
20 TABLET ORAL DAILY
Qty: 90 TABLET | Refills: 1 | Status: SHIPPED | OUTPATIENT
Start: 2023-05-09

## 2023-05-09 SDOH — ECONOMIC STABILITY: HOUSING INSECURITY
IN THE LAST 12 MONTHS, WAS THERE A TIME WHEN YOU DID NOT HAVE A STEADY PLACE TO SLEEP OR SLEPT IN A SHELTER (INCLUDING NOW)?: NO

## 2023-05-09 SDOH — ECONOMIC STABILITY: INCOME INSECURITY: HOW HARD IS IT FOR YOU TO PAY FOR THE VERY BASICS LIKE FOOD, HOUSING, MEDICAL CARE, AND HEATING?: NOT HARD AT ALL

## 2023-05-09 SDOH — ECONOMIC STABILITY: FOOD INSECURITY: WITHIN THE PAST 12 MONTHS, THE FOOD YOU BOUGHT JUST DIDN'T LAST AND YOU DIDN'T HAVE MONEY TO GET MORE.: NEVER TRUE

## 2023-05-09 SDOH — ECONOMIC STABILITY: FOOD INSECURITY: WITHIN THE PAST 12 MONTHS, YOU WORRIED THAT YOUR FOOD WOULD RUN OUT BEFORE YOU GOT MONEY TO BUY MORE.: NEVER TRUE

## 2023-05-09 ASSESSMENT — PATIENT HEALTH QUESTIONNAIRE - PHQ9
SUM OF ALL RESPONSES TO PHQ QUESTIONS 1-9: 0
SUM OF ALL RESPONSES TO PHQ9 QUESTIONS 1 & 2: 0
SUM OF ALL RESPONSES TO PHQ QUESTIONS 1-9: 0
2. FEELING DOWN, DEPRESSED OR HOPELESS: 0
SUM OF ALL RESPONSES TO PHQ QUESTIONS 1-9: 0
SUM OF ALL RESPONSES TO PHQ QUESTIONS 1-9: 0
1. LITTLE INTEREST OR PLEASURE IN DOING THINGS: 0

## 2023-05-09 ASSESSMENT — ENCOUNTER SYMPTOMS
BLOOD IN STOOL: 0
EYE DISCHARGE: 0
SHORTNESS OF BREATH: 0
CONSTIPATION: 0
EYE REDNESS: 0
DIARRHEA: 0
TROUBLE SWALLOWING: 0

## 2023-05-09 NOTE — PROGRESS NOTES
HPI Notes    Name: Sabine An  : 1990        Chief Complaint:     Chief Complaint   Patient presents with    Diabetes     6 month check, last A1C 7.1 from 10/10/2022. Hypertension     6 month check, Pt states that he has been feeling good. History of Present Illness:     Sabine An is a 27 y.o.  male who presents with Diabetes (6 month check, last A1C 7.1 from 10/10/2022.) and Hypertension (6 month check, Pt states that he has been feeling good.)      Diabetes  He presents for his follow-up diabetic visit. He has type 2 diabetes mellitus. Pertinent negatives for hypoglycemia include no dizziness. Pertinent negatives for diabetes include no chest pain. There are no hypoglycemic complications. Risk factors for coronary artery disease include diabetes mellitus, hypertension and male sex. Current diabetic treatment includes oral agent (dual therapy) (pt was out of Januvia for about 3wks in the past 3mos. ). He is compliant with treatment some of the time. His weight is stable. He is following a generally healthy (pt got an air fryer and eating more tator tots and pizza rolls) diet. When asked about meal planning, he reported none. Exercise: pt is going to the gym. His home blood glucose trend is increasing steadily (hgba1c up to 9.9 and working alot and so eating habits are worse at work. ). An ACE inhibitor/angiotensin II receptor blocker is being taken. Hypertension  This is a chronic problem. The current episode started more than 1 year ago. The problem is unchanged. The problem is controlled. Pertinent negatives include no chest pain, palpitations, peripheral edema or shortness of breath. Risk factors for coronary artery disease include diabetes mellitus and male gender. The current treatment provides significant improvement.      Past Medical History:     Past Medical History:   Diagnosis Date    ADHD (attention deficit hyperactivity disorder)     Head concussion     6 th grade

## 2023-05-09 NOTE — PATIENT INSTRUCTIONS
[facility] SURVEY:    You may be receiving a survey from [employer] regarding your visit today. You may get this in the mail, through your MyChart or in your email. Please complete the survey to enable us to provide the highest quality of care to you and your family. If you cannot score us as very good ( 5 Stars) on any question, please feel free to call the office to discuss how we could have made your experience exceptional.     Thank you.     Clinical Care Team:   Dr. Akhil Lee, MD [facility], [place]                                     Triage: Radha Rios, [address] Team:  Rajat De León Organ

## 2023-09-01 DIAGNOSIS — E11.69 TYPE 2 DIABETES MELLITUS WITH OTHER SPECIFIED COMPLICATION, WITHOUT LONG-TERM CURRENT USE OF INSULIN (HCC): ICD-10-CM

## 2023-09-01 RX ORDER — GLIPIZIDE 5 MG/1
TABLET ORAL
Qty: 180 TABLET | Refills: 1 | Status: SHIPPED | OUTPATIENT
Start: 2023-09-01

## 2023-09-01 RX ORDER — LISINOPRIL 20 MG/1
20 TABLET ORAL DAILY
Qty: 90 TABLET | Refills: 1 | Status: SHIPPED | OUTPATIENT
Start: 2023-09-01

## 2023-09-01 NOTE — TELEPHONE ENCOUNTER
Last OV: 5/9/2023  chronic   Last RX:    Next scheduled apt: 9/7/2023    3 months             Pt requesting a refill

## 2023-09-01 NOTE — TELEPHONE ENCOUNTER
Patient requesting refill of glipizide, januvia, and lisinopril to [facility] in [place].

## 2023-09-22 ENCOUNTER — HOSPITAL ENCOUNTER (EMERGENCY)
Age: 33
LOS: 1 days | Discharge: HOME | End: 2023-09-23
Payer: COMMERCIAL

## 2023-09-22 VITALS
RESPIRATION RATE: 18 BRPM | DIASTOLIC BLOOD PRESSURE: 107 MMHG | OXYGEN SATURATION: 99 % | TEMPERATURE: 97.88 F | HEART RATE: 71 BPM | SYSTOLIC BLOOD PRESSURE: 160 MMHG

## 2023-09-22 VITALS — BODY MASS INDEX: 29.8 KG/M2
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
## 2023-09-22 DIAGNOSIS — S61.311A: Primary | ICD-10-CM

## 2023-09-22 PROCEDURE — 99284 EMERGENCY DEPT VISIT MOD MDM: CPT

## 2023-09-22 PROCEDURE — 12001 RPR S/N/AX/GEN/TRNK 2.5CM/<: CPT

## 2023-09-22 NOTE — ED_ITS
HPI - Wound/Laceration    
General    
Chief Complaint: Wound/Laceration    
Stated Complaint: FINGER INJURY-WC    
Time Seen by Provider: 09/22/23 22:34    
Source: patient    
Mode of arrival: walk-in    
Limitations: no limitations    
History of Present Illness    
HPI narrative:     
cut tip of left index finger with a razor at work. States tetanus UTD. Believes   
last tetanus was 3-4 years ago. Denies weakness or finger numbness. No other   
injuries    
Onset (ago): minute(s)    
Related Data    
                                    Allergies    
    
    
    
Allergy/AdvReac Type Severity Reaction Status Date / Time    
     
No Known Drug Allergies Allergy   Verified 09/22/23 22:27    
    
    
    
    
Review of Systems    
    
    
ROS      
    
 Status of ROS 10 or more systems reviewed and unremarkable except as noted in   
history and below       
    
    
Exam    
Constitutional    
Vital Signs, click to edit/add:     
    
                                Last Vital Signs    
    
    
    
Temp  98 F   09/22/23 22:24    
     
Pulse  71   09/22/23 22:24    
     
Resp  18   09/22/23 22:24    
     
BP  160/107 H  09/22/23 22:24    
     
Pulse Ox  99   09/22/23 22:24    
     
O2 Del Method  Room Air  09/22/23 22:24    
    
    
    
    
Common normals: no apparent distress, average body habitus, oriented x3, no   
limitations, healthy appearing, alert and well nourished    
Eye    
Common normals: EOMs intact bilaterally and conjunctivae normal    
Respiratory    
Common normals: normal respiratory effort, no retractions and no use of   
accessory muscles    
Extremity    
Other:     
small lac along side tip of left index finger. also cut thru small lateral side   
of the nail    
Neuro    
Common normals: oriented x3, CN's II-XII intact bilaterally, moves all extremiti  
es, no focal motor deficits and no sensory deficits noted    
Psych    
Appearance: grossly normal    
    
Course    
Vital Signs    
Vital signs:     
    
                                   Vital Signs    
    
    
    
Temperature  98 F   09/22/23 22:24    
     
Pulse Rate  71   09/22/23 22:24    
     
Respiratory Rate  18   09/22/23 22:24    
     
Blood Pressure  160/107 H  09/22/23 22:24    
     
Pulse Oximetry  99   09/22/23 22:24    
     
Oxygen Delivery Method  Room Air  09/22/23 22:24    
    
    
                                            
    
    
    
Temperature  98 F   09/22/23 22:24    
     
Pulse Rate  71   09/22/23 22:24    
     
Respiratory Rate  18   09/22/23 22:24    
     
Blood Pressure  160/107 H  09/22/23 22:24    
     
Pulse Oximetry  99   09/22/23 22:24    
     
Oxygen Delivery Method  Room Air  09/22/23 22:24    
    
    
    
    
    
MDM - Wound/Laceration    
MDM Narrative    
Medical decision making narrative:     
presents with minor lac left index finger. Lac repaired without difficulty. No   
suspected bony involvement as the cut was on thee side of the finger    
    
Discharge Plan    
Discharge    
Chief Complaint: Wound/Laceration    
    
Clinical Impression:    
 Laceration    
    
    
Patient Disposition: Home, Self-Care    
    
Instructions:  Laceration (ED)    
    
Additional Instructions:    
have wound rechecked in 2-3 days and stitches removed in 10    
    
Stand Alone Forms:  Portal Instructions    
    
Referrals:    
Physician,Non-Staff, MD [Primary Care Provider] - 1 week    
    
    
Procedures    
ED Procedure Instructions    
Procedures    
Procedures:     
laceration 12mm tip of left index finger. small corner of nail cut thru also. 1%  
lido as a local. Cleaned with betadine. small piece of nail removed from nail   
bed. lac closed with  # 3  5.0 nylon stitches    
tolerated well

## 2023-09-22 NOTE — PC.NURSE
pt presents to ED because pt was working at [employer] and was cleaning a machine and cut tip of pointer finger on left hand with metal. tetanus up to date. pt cleaned up with soap and water and soaking at this time

## 2023-09-22 NOTE — ED.WOUNDLAC1
HPI - Wound/Laceration
General
Chief Complaint: Wound/Laceration
Stated Complaint: FINGER INJURY-WC
Time Seen by Provider: 09/22/23 22:34
Source: patient
Mode of arrival: walk-in
Limitations: no limitations
History of Present Illness
HPI narrative: 
cut tip of left index finger with a razor at work. States tetanus UTD. Believes last tetanus was 3-4 years ago. Denies weakness or finger numbness. No other injuries
Onset (ago): minute(s)
Related Data
Allergies

Allergy/AdvReac Type Severity Reaction Status Date / Time
No Known Drug Allergies Allergy   Verified 09/22/23 22:27



Review of Systems
ROS  
 Status of ROS 10 or more systems reviewed and unremarkable except as noted in history and below   

Exam
Constitutional
Vital Signs, click to edit/add: 

Last Vital Signs

Temp  98 F   09/22/23 22:24
Pulse  71   09/22/23 22:24
Resp  18   09/22/23 22:24
BP  160/107 H  09/22/23 22:24
Pulse Ox  99   09/22/23 22:24
O2 Del Method  Room Air  09/22/23 22:24



Common normals: no apparent distress, average body habitus, oriented x3, no limitations, healthy appearing, alert and well nourished
Eye
Common normals: EOMs intact bilaterally and conjunctivae normal
Respiratory
Common normals: normal respiratory effort, no retractions and no use of accessory muscles
Extremity
Other: 
small lac along side tip of left index finger. also cut thru small lateral side of the nail
Neuro
Common normals: oriented x3, CN's II-XII intact bilaterally, moves all extremities, no focal motor deficits and no sensory deficits noted
Psych
Appearance: grossly normal

Course
Vital Signs
Vital signs: 

Vital Signs

Temperature  98 F   09/22/23 22:24
Pulse Rate  71   09/22/23 22:24
Respiratory Rate  18   09/22/23 22:24
Blood Pressure  160/107 H  09/22/23 22:24
Pulse Oximetry  99   09/22/23 22:24
Oxygen Delivery Method  Room Air  09/22/23 22:24



Temperature  98 F   09/22/23 22:24
Pulse Rate  71   09/22/23 22:24
Respiratory Rate  18   09/22/23 22:24
Blood Pressure  160/107 H  09/22/23 22:24
Pulse Oximetry  99   09/22/23 22:24
Oxygen Delivery Method  Room Air  09/22/23 22:24




MDM - Wound/Laceration
MDM Narrative
Medical decision making narrative: 
presents with minor lac left index finger. Lac repaired without difficulty. No suspected bony involvement as the cut was on thee side of the finger

Discharge Plan
Discharge
Chief Complaint: Wound/Laceration

Clinical Impression:
 Laceration


Patient Disposition: Home, Self-Care

Instructions:  Laceration (ED)

Additional Instructions:
have wound rechecked in 2-3 days and stitches removed in 10

Stand Alone Forms:  Portal Instructions

Referrals:
Physician,Non-Staff, MD [Primary Care Provider] - 1 week


Procedures
ED Procedure Instructions
Procedures
Procedures: 
laceration 12mm tip of left index finger. small corner of nail cut thru also. 1% lido as a local. Cleaned with betadine. small piece of nail removed from nail bed. lac closed with  # 3  5.0 nylon stitches
tolerated well

## 2023-09-26 ENCOUNTER — OFFICE VISIT (OUTPATIENT)
Dept: FAMILY MEDICINE CLINIC | Age: 33
End: 2023-09-26
Payer: COMMERCIAL

## 2023-09-26 VITALS
RESPIRATION RATE: 18 BRPM | WEIGHT: 210 LBS | OXYGEN SATURATION: 99 % | SYSTOLIC BLOOD PRESSURE: 138 MMHG | BODY MASS INDEX: 32.96 KG/M2 | DIASTOLIC BLOOD PRESSURE: 85 MMHG | HEIGHT: 67 IN | HEART RATE: 88 BPM

## 2023-09-26 DIAGNOSIS — E11.69 TYPE 2 DIABETES MELLITUS WITH OTHER SPECIFIED COMPLICATION, WITHOUT LONG-TERM CURRENT USE OF INSULIN (HCC): Primary | ICD-10-CM

## 2023-09-26 LAB — HBA1C MFR BLD: 8.2 %

## 2023-09-26 PROCEDURE — 99213 OFFICE O/P EST LOW 20 MIN: CPT | Performed by: FAMILY MEDICINE

## 2023-09-26 PROCEDURE — 3079F DIAST BP 80-89 MM HG: CPT | Performed by: FAMILY MEDICINE

## 2023-09-26 PROCEDURE — 3052F HG A1C>EQUAL 8.0%<EQUAL 9.0%: CPT | Performed by: FAMILY MEDICINE

## 2023-09-26 PROCEDURE — 3075F SYST BP GE 130 - 139MM HG: CPT | Performed by: FAMILY MEDICINE

## 2023-09-26 PROCEDURE — 83036 HEMOGLOBIN GLYCOSYLATED A1C: CPT | Performed by: FAMILY MEDICINE

## 2023-09-26 ASSESSMENT — ENCOUNTER SYMPTOMS
ABDOMINAL PAIN: 0
CONSTIPATION: 0
COUGH: 0
EYE DISCHARGE: 0
SHORTNESS OF BREATH: 0
VOMITING: 0
BLOOD IN STOOL: 0
NAUSEA: 0
EYE REDNESS: 0
DIARRHEA: 0
TROUBLE SWALLOWING: 0

## 2024-01-30 DIAGNOSIS — E11.69 TYPE 2 DIABETES MELLITUS WITH OTHER SPECIFIED COMPLICATION, WITHOUT LONG-TERM CURRENT USE OF INSULIN (HCC): ICD-10-CM

## 2024-01-30 RX ORDER — GLIPIZIDE 5 MG/1
TABLET ORAL
Qty: 180 TABLET | Refills: 1 | Status: SHIPPED | OUTPATIENT
Start: 2024-01-30

## 2024-01-30 NOTE — TELEPHONE ENCOUNTER
Last OV 09/26/23 DM     Requesting refill on glipizide thru surescripts.  Rx pending     Next OV    Problem: Potential for Falls  Goal: Patient will remain free of falls  Description  INTERVENTIONS:  - Assess patient frequently for physical needs  -  Identify cognitive and physical deficits and behaviors that affect risk of falls    -  [place] fall precautions as indicated by assessment   - Educate patient/family on patient safety including physical limitations  - Instruct patient to call for assistance with activity based on assessment  - Modify environment to reduce risk of injury  - Consider OT/PT consult to assist with strengthening/mobility  Outcome: Progressing

## 2024-01-31 RX ORDER — SITAGLIPTIN 50 MG/1
50 TABLET, FILM COATED ORAL DAILY
Qty: 90 TABLET | Refills: 1 | Status: SHIPPED | OUTPATIENT
Start: 2024-01-31

## 2024-03-08 DIAGNOSIS — E11.69 TYPE 2 DIABETES MELLITUS WITH OTHER SPECIFIED COMPLICATION, WITHOUT LONG-TERM CURRENT USE OF INSULIN (HCC): ICD-10-CM

## 2024-03-08 RX ORDER — SITAGLIPTIN 50 MG/1
50 TABLET, FILM COATED ORAL DAILY
Qty: 90 TABLET | Refills: 1 | OUTPATIENT
Start: 2024-03-08

## 2024-03-08 RX ORDER — LISINOPRIL 20 MG/1
20 TABLET ORAL DAILY
Qty: 90 TABLET | Refills: 1 | Status: SHIPPED | OUTPATIENT
Start: 2024-03-08

## 2024-03-08 NOTE — TELEPHONE ENCOUNTER
Last OV: 9/26/2023  chronic   Last RX:    Next scheduled apt: 4/3/2024  DM           Surescript requesting a refill

## 2024-04-03 DIAGNOSIS — E11.69 TYPE 2 DIABETES MELLITUS WITH OTHER SPECIFIED COMPLICATION, WITHOUT LONG-TERM CURRENT USE OF INSULIN (HCC): ICD-10-CM

## 2024-04-03 RX ORDER — LISINOPRIL 20 MG/1
20 TABLET ORAL DAILY
Qty: 90 TABLET | Refills: 1 | Status: SHIPPED | OUTPATIENT
Start: 2024-04-03

## 2024-04-03 RX ORDER — GLIPIZIDE 5 MG/1
TABLET ORAL
Qty: 180 TABLET | Refills: 1 | Status: SHIPPED | OUTPATIENT
Start: 2024-04-03

## 2024-04-24 ENCOUNTER — OFFICE VISIT (OUTPATIENT)
Dept: FAMILY MEDICINE CLINIC | Age: 34
End: 2024-04-24
Payer: COMMERCIAL

## 2024-04-24 VITALS
HEART RATE: 78 BPM | OXYGEN SATURATION: 98 % | DIASTOLIC BLOOD PRESSURE: 86 MMHG | SYSTOLIC BLOOD PRESSURE: 128 MMHG | WEIGHT: 209 LBS | BODY MASS INDEX: 32.73 KG/M2

## 2024-04-24 DIAGNOSIS — I10 PRIMARY HYPERTENSION: ICD-10-CM

## 2024-04-24 DIAGNOSIS — E11.69 TYPE 2 DIABETES MELLITUS WITH OTHER SPECIFIED COMPLICATION, WITHOUT LONG-TERM CURRENT USE OF INSULIN (HCC): Primary | ICD-10-CM

## 2024-04-24 LAB — HBA1C MFR BLD: 10.4 %

## 2024-04-24 PROCEDURE — 83036 HEMOGLOBIN GLYCOSYLATED A1C: CPT | Performed by: FAMILY MEDICINE

## 2024-04-24 PROCEDURE — 3079F DIAST BP 80-89 MM HG: CPT | Performed by: FAMILY MEDICINE

## 2024-04-24 PROCEDURE — 3074F SYST BP LT 130 MM HG: CPT | Performed by: FAMILY MEDICINE

## 2024-04-24 PROCEDURE — 99214 OFFICE O/P EST MOD 30 MIN: CPT | Performed by: FAMILY MEDICINE

## 2024-04-24 ASSESSMENT — PATIENT HEALTH QUESTIONNAIRE - PHQ9
2. FEELING DOWN, DEPRESSED OR HOPELESS: NOT AT ALL
1. LITTLE INTEREST OR PLEASURE IN DOING THINGS: NOT AT ALL
SUM OF ALL RESPONSES TO PHQ9 QUESTIONS 1 & 2: 0
SUM OF ALL RESPONSES TO PHQ QUESTIONS 1-9: 0

## 2024-04-24 ASSESSMENT — ENCOUNTER SYMPTOMS
DIARRHEA: 0
TROUBLE SWALLOWING: 0
EYE DISCHARGE: 0
NAUSEA: 0
EYE REDNESS: 0
BLOOD IN STOOL: 0
VOMITING: 0
ABDOMINAL PAIN: 0
COUGH: 0
SHORTNESS OF BREATH: 0

## 2024-04-24 NOTE — PATIENT INSTRUCTIONS
SURVEY:    You may be receiving a survey from [facility] regarding your visit today.    Please complete the survey to enable us to provide the highest quality of care to you and your family.    If you cannot score us a very good (5 Stars) on any question, please call the office to discuss how we could have made your experience a very good one.    Thank you.    Clinical Care Team: MD Jacoby Higgins LPN              Triage: Justina Huber CMA              Clerical Team: Justina Duckworth

## 2024-04-24 NOTE — PROGRESS NOTES
HPI Notes    Name: Everett Damon  : 1990        Chief Complaint:     Chief Complaint   Patient presents with    Diabetes Mellitus     6 month follow up. Last A1C was 8.2 in     Hypertension       History of Present Illness:     Everett Damon is a 33 y.o.  male who presents with Diabetes Mellitus (6 month follow up. Last A1C was 8.2 in ) and Hypertension      Hypertension  This is a chronic problem. The current episode started more than 1 year ago. The problem is unchanged. The problem is controlled. Pertinent negatives include no chest pain, headaches, malaise/fatigue, palpitations, peripheral edema or shortness of breath. There are no associated agents to hypertension. Risk factors for coronary artery disease include diabetes mellitus and male gender. The current treatment provides significant improvement.   Diabetes  He presents for his follow-up diabetic visit. He has type 2 diabetes mellitus. His disease course has been stable. Pertinent negatives for hypoglycemia include no dizziness, headaches or tremors. Pertinent negatives for diabetes include no chest pain and no fatigue. There are no hypoglycemic complications. There are no diabetic complications. Risk factors for coronary artery disease include diabetes mellitus, hypertension and male sex. Current diabetic treatment includes oral agent (dual therapy). He is compliant with treatment some of the time. His weight is stable. He is following a generally unhealthy (pt admits he is eating more cookies and Goldfish as he works at [employer] now.) diet. He rarely (pt got a new job at [employer] and 3rd shift SO he has not been going to the Gym anymore.) participates in exercise. His home blood glucose trend is increasing steadily (hgba1c from 8.2 to 10.4). An ACE inhibitor/angiotensin II receptor blocker is being taken.       Past Medical History:     Past Medical History:   Diagnosis Date    ADHD (attention deficit hyperactivity

## 2024-09-13 DIAGNOSIS — E11.69 TYPE 2 DIABETES MELLITUS WITH OTHER SPECIFIED COMPLICATION, WITHOUT LONG-TERM CURRENT USE OF INSULIN (HCC): ICD-10-CM

## 2024-09-13 RX ORDER — LISINOPRIL 20 MG/1
20 TABLET ORAL DAILY
Qty: 90 TABLET | Refills: 1 | Status: SHIPPED | OUTPATIENT
Start: 2024-09-13

## 2024-09-13 RX ORDER — GLIPIZIDE 5 MG/1
TABLET ORAL
Qty: 180 TABLET | Refills: 1 | Status: SHIPPED | OUTPATIENT
Start: 2024-09-13

## 2024-10-22 ENCOUNTER — OFFICE VISIT (OUTPATIENT)
Dept: FAMILY MEDICINE CLINIC | Age: 34
End: 2024-10-22
Payer: COMMERCIAL

## 2024-10-22 VITALS
OXYGEN SATURATION: 99 % | DIASTOLIC BLOOD PRESSURE: 84 MMHG | WEIGHT: 208 LBS | SYSTOLIC BLOOD PRESSURE: 134 MMHG | BODY MASS INDEX: 32.58 KG/M2

## 2024-10-22 DIAGNOSIS — I10 PRIMARY HYPERTENSION: ICD-10-CM

## 2024-10-22 DIAGNOSIS — E11.69 TYPE 2 DIABETES MELLITUS WITH OTHER SPECIFIED COMPLICATION, WITHOUT LONG-TERM CURRENT USE OF INSULIN (HCC): Primary | ICD-10-CM

## 2024-10-22 LAB — HBA1C MFR BLD: 8.5 %

## 2024-10-22 PROCEDURE — 99213 OFFICE O/P EST LOW 20 MIN: CPT | Performed by: FAMILY MEDICINE

## 2024-10-22 PROCEDURE — 83036 HEMOGLOBIN GLYCOSYLATED A1C: CPT | Performed by: FAMILY MEDICINE

## 2024-10-22 PROCEDURE — 3052F HG A1C>EQUAL 8.0%<EQUAL 9.0%: CPT | Performed by: FAMILY MEDICINE

## 2024-10-22 PROCEDURE — 3075F SYST BP GE 130 - 139MM HG: CPT | Performed by: FAMILY MEDICINE

## 2024-10-22 PROCEDURE — 3079F DIAST BP 80-89 MM HG: CPT | Performed by: FAMILY MEDICINE

## 2024-10-22 SDOH — ECONOMIC STABILITY: FOOD INSECURITY: WITHIN THE PAST 12 MONTHS, THE FOOD YOU BOUGHT JUST DIDN'T LAST AND YOU DIDN'T HAVE MONEY TO GET MORE.: NEVER TRUE

## 2024-10-22 SDOH — ECONOMIC STABILITY: FOOD INSECURITY: WITHIN THE PAST 12 MONTHS, YOU WORRIED THAT YOUR FOOD WOULD RUN OUT BEFORE YOU GOT MONEY TO BUY MORE.: NEVER TRUE

## 2024-10-22 SDOH — ECONOMIC STABILITY: INCOME INSECURITY: HOW HARD IS IT FOR YOU TO PAY FOR THE VERY BASICS LIKE FOOD, HOUSING, MEDICAL CARE, AND HEATING?: NOT HARD AT ALL

## 2024-10-22 ASSESSMENT — ENCOUNTER SYMPTOMS
VOMITING: 0
TROUBLE SWALLOWING: 0
BLOOD IN STOOL: 0
NAUSEA: 0
SHORTNESS OF BREATH: 0
EYE REDNESS: 0
COUGH: 0
ABDOMINAL PAIN: 0
DIARRHEA: 0
EYE DISCHARGE: 0

## 2024-10-22 NOTE — PATIENT INSTRUCTIONS
SURVEY:    You may be receiving a survey from [facility] regarding your visit today.    Please complete the survey to enable us to provide the highest quality of care to you and your family.    If you cannot score us a very good (5 Stars) on any question, please call the office to discuss how we could have made your experience a very good one.    Thank you.    Clinical Care Team: MD Jacoby Higgins LPN              Triage: Justina Huber CMA              Clerical Team: Justina Duckworth

## 2024-10-22 NOTE — PROGRESS NOTES
05/09/2023 07:42 AM    K 4.1 05/09/2023 07:42 AM     05/09/2023 07:42 AM    CO2 28 05/09/2023 07:42 AM    BUN 9 05/09/2023 07:42 AM    CREATININE 0.83 05/09/2023 07:42 AM    GLUCOSE 180 05/09/2023 07:42 AM    BILITOT 0.6 05/09/2023 07:42 AM    ALKPHOS 110 05/09/2023 07:42 AM    AST 62 05/09/2023 07:42 AM     05/09/2023 07:42 AM     Lab Results   Component Value Date/Time    WBC 8.4 08/14/2021 08:16 PM    RBC 5.91 08/14/2021 08:16 PM    HGB 17.5 08/14/2021 08:16 PM    HCT 50.3 08/14/2021 08:16 PM    MCV 85.2 08/14/2021 08:16 PM    MCH 29.5 08/14/2021 08:16 PM    MCHC 34.7 08/14/2021 08:16 PM    RDW 13.4 08/14/2021 08:16 PM     08/14/2021 08:16 PM    MPV NOT REPORTED 08/14/2021 08:16 PM     Lab Results   Component Value Date/Time    TSH 0.95 06/10/2021 01:23 PM     Lab Results   Component Value Date/Time    CHOL 147 05/09/2023 07:42 AM    LDL 68 05/09/2023 07:42 AM    HDL 29 05/09/2023 07:42 AM    LABA1C 8.5 10/22/2024 08:05 AM          Assessment/Plan:        1. Type 2 diabetes mellitus with other specified complication, without long-term current use of insulin (HCC)  F/U 6mos, prior CMP, Lipids, HgbA1C. Do daily foot checks and yearly eye exams  Stable on glucotrol and januiva  - POCT glycosylated hemoglobin (Hb A1C)    2. Primary hypertension  Stable on the zestril         Everett received counseling on the following healthy behaviors: nutrition and exercise  Reviewed prior labs and health maintenance  Continue current medications, diet and exercise.  Discussed use, benefit, and side effects of prescribed medications. Barriers to medication compliance addressed.   Patient given educational materials - see patient instructions  Was a self-tracking handout given in paper form or via [employer]? Yes    Requested Prescriptions      No prescriptions requested or ordered in this encounter       All patient questions answered.  Patient voiced understanding.    Quality Measures    Body mass index is 32.58

## 2024-12-13 ENCOUNTER — TELEPHONE (OUTPATIENT)
Dept: FAMILY MEDICINE CLINIC | Age: 34
End: 2024-12-13

## 2024-12-13 NOTE — TELEPHONE ENCOUNTER
Patient is trying to get employment through the [employer] - they are asking for a letter stating that he is able to work with no restrictions - could that be drafted - I have CECY signed so that we can send the letter along with A1c readings & medication list

## 2024-12-21 DIAGNOSIS — E11.69 TYPE 2 DIABETES MELLITUS WITH OTHER SPECIFIED COMPLICATION, WITHOUT LONG-TERM CURRENT USE OF INSULIN (HCC): ICD-10-CM

## 2024-12-23 RX ORDER — LISINOPRIL 10 MG/1
10 TABLET ORAL DAILY
Qty: 90 TABLET | Refills: 1 | Status: SHIPPED | OUTPATIENT
Start: 2024-12-23

## 2025-02-12 ENCOUNTER — OFFICE VISIT (OUTPATIENT)
Dept: FAMILY MEDICINE CLINIC | Age: 35
End: 2025-02-12
Payer: COMMERCIAL

## 2025-02-12 VITALS
HEART RATE: 86 BPM | TEMPERATURE: 99.1 F | DIASTOLIC BLOOD PRESSURE: 84 MMHG | BODY MASS INDEX: 31.64 KG/M2 | OXYGEN SATURATION: 94 % | SYSTOLIC BLOOD PRESSURE: 130 MMHG | WEIGHT: 202 LBS

## 2025-02-12 DIAGNOSIS — J10.1 INFLUENZA A: Primary | ICD-10-CM

## 2025-02-12 DIAGNOSIS — R52 GENERALIZED BODY ACHES: ICD-10-CM

## 2025-02-12 DIAGNOSIS — R50.9 FEVER, UNSPECIFIED FEVER CAUSE: ICD-10-CM

## 2025-02-12 LAB
INFLUENZA A ANTIGEN, POC: POSITIVE
INFLUENZA B ANTIGEN, POC: NEGATIVE
LOT NUMBER POC: ABNORMAL
SARS-COV-2 RNA POC - COV: ABNORMAL
VALID INTERNAL CONTROL, POC: PRESENT
VENDOR AND KIT NAME POC: ABNORMAL

## 2025-02-12 PROCEDURE — 3079F DIAST BP 80-89 MM HG: CPT | Performed by: FAMILY MEDICINE

## 2025-02-12 PROCEDURE — 99213 OFFICE O/P EST LOW 20 MIN: CPT | Performed by: FAMILY MEDICINE

## 2025-02-12 PROCEDURE — 3075F SYST BP GE 130 - 139MM HG: CPT | Performed by: FAMILY MEDICINE

## 2025-02-12 RX ORDER — OSELTAMIVIR PHOSPHATE 75 MG/1
75 CAPSULE ORAL 2 TIMES DAILY
Qty: 10 CAPSULE | Refills: 0 | Status: SHIPPED | OUTPATIENT
Start: 2025-02-12 | End: 2025-02-17

## 2025-02-12 SDOH — ECONOMIC STABILITY: FOOD INSECURITY: WITHIN THE PAST 12 MONTHS, YOU WORRIED THAT YOUR FOOD WOULD RUN OUT BEFORE YOU GOT MONEY TO BUY MORE.: NEVER TRUE

## 2025-02-12 SDOH — ECONOMIC STABILITY: FOOD INSECURITY: WITHIN THE PAST 12 MONTHS, THE FOOD YOU BOUGHT JUST DIDN'T LAST AND YOU DIDN'T HAVE MONEY TO GET MORE.: NEVER TRUE

## 2025-02-12 ASSESSMENT — PATIENT HEALTH QUESTIONNAIRE - PHQ9
SUM OF ALL RESPONSES TO PHQ QUESTIONS 1-9: 0
SUM OF ALL RESPONSES TO PHQ QUESTIONS 1-9: 0
2. FEELING DOWN, DEPRESSED OR HOPELESS: NOT AT ALL
SUM OF ALL RESPONSES TO PHQ9 QUESTIONS 1 & 2: 0
SUM OF ALL RESPONSES TO PHQ QUESTIONS 1-9: 0
SUM OF ALL RESPONSES TO PHQ QUESTIONS 1-9: 0
1. LITTLE INTEREST OR PLEASURE IN DOING THINGS: NOT AT ALL

## 2025-02-12 ASSESSMENT — ENCOUNTER SYMPTOMS
SORE THROAT: 0
SHORTNESS OF BREATH: 0
EYE DISCHARGE: 0
COUGH: 1
EYE REDNESS: 0
FACIAL SWELLING: 0

## 2025-02-12 NOTE — PROGRESS NOTES
HPI Notes    Name: Everett Damon  : 1990        Chief Complaint:     Chief Complaint   Patient presents with    Cold Symptoms     Patient complains of fever, chest congestion, cough,headache,body aches. Started 2 days ago       History of Present Illness:     Everett Damon is a 34 y.o.  male who presents with Cold Symptoms (Patient complains of fever, chest congestion, cough,headache,body aches. Started 2 days ago)      Cold Symptoms   This is a new problem. The current episode started in the past 7 days (started 2d ago with cough, congestion, HA and body aches). The maximum temperature recorded prior to his arrival was 102 - 102.9 F. Associated symptoms include congestion, coughing and headaches. Pertinent negatives include no sore throat. Associated symptoms comments: Body aches. Treatments tried: day quil and Nyquil.       Past Medical History:     Past Medical History:   Diagnosis Date    ADHD (attention deficit hyperactivity disorder)     Head concussion     6 th grade (football)    Hypertension     Type 2 diabetes mellitus without complication (HCC)       Reviewed all health maintenance requirements and ordered appropriate tests  Health Maintenance Due   Topic Date Due    Varicella vaccine (1 of 2 - 13+ 2-dose series) Never done    HIV screen  Never done    Diabetic retinal exam  Never done    Hepatitis B vaccine (1 of 3 - 19+ 3-dose series) Never done    Pneumococcal 0-49 years Vaccine (1 of 2 - PCV) Never done    Diabetic Alb to Cr ratio (uACR) test  2023    Lipids  2024    GFR test (Diabetes, CKD 3-4, OR last GFR 15-59)  2024    Flu vaccine (1) 2024    COVID-19 Vaccine ( - - season) Never done       Past Surgical History:     History reviewed. No pertinent surgical history.     Medications:       Prior to Admission medications    Medication Sig Start Date End Date Taking? Authorizing Provider   oseltamivir (TAMIFLU) 75 MG capsule Take 1 capsule by mouth

## 2025-02-12 NOTE — PATIENT INSTRUCTIONS
SURVEY:    You may be receiving a survey from [facility] regarding your visit today.    Please complete the survey to enable us to provide the highest quality of care to you and your family.    If you cannot score us a very good (5 Stars) on any question, please call the office to discuss how we could have made your experience a very good one.    Thank you.    Clinical Care Team: MD Jacoby Higgins LPN              Triage: Justina Huber CMA              Clerical Team: Justina Duckworth

## 2025-02-18 DIAGNOSIS — E11.69 TYPE 2 DIABETES MELLITUS WITH OTHER SPECIFIED COMPLICATION, WITHOUT LONG-TERM CURRENT USE OF INSULIN (HCC): ICD-10-CM

## 2025-02-18 RX ORDER — LISINOPRIL 20 MG/1
20 TABLET ORAL DAILY
Qty: 90 TABLET | Refills: 1 | Status: SHIPPED | OUTPATIENT
Start: 2025-02-18

## 2025-02-18 NOTE — TELEPHONE ENCOUNTER
Lisinopril 10 mg    Dm[facility] Floyd said he is holding a 10 mg empty bottle in his hand and we have 20 mg in his med list. Can we please clarify what he is supposed to be taking and send a refill in for him.    Last check up 10/22/24  Appt 4/22/25    Health Maintenance   Topic Date Due    Varicella vaccine (1 of 2 - 13+ 2-dose series) Never done    HIV screen  Never done    Diabetic retinal exam  Never done    Hepatitis B vaccine (1 of 3 - 19+ 3-dose series) Never done    Pneumococcal 0-49 years Vaccine (1 of 2 - PCV) Never done    Diabetic Alb to Cr ratio (uACR) test  07/05/2023    Lipids  05/09/2024    GFR test (Diabetes, CKD 3-4, OR last GFR 15-59)  05/09/2024    Flu vaccine (1) 08/01/2024    COVID-19 Vaccine (1 - 2024-25 season) Never done    Diabetic foot exam  04/24/2025    A1C test (Diabetic or Prediabetic)  10/22/2025    Depression Screen  02/12/2026    DTaP/Tdap/Td vaccine (2 - Td or Tdap) 10/25/2030    Hepatitis C screen  Completed    Hepatitis A vaccine  Aged Out    Hib vaccine  Aged Out    HPV vaccine  Aged Out    Polio vaccine  Aged Out    Meningococcal (ACWY) vaccine  Aged Out             (applicable per patient's age: Cancer Screenings, Depression Screening, Fall Risk Screening, Immunizations)    Hemoglobin A1C (%)   Date Value   10/22/2024 8.5   04/24/2024 10.4   09/26/2023 8.2     AST (U/L)   Date Value   05/09/2023 62 (H)     ALT (U/L)   Date Value   05/09/2023 211 (H)     BUN (mg/dL)   Date Value   05/09/2023 9      (goal A1C is < 7)   (goal LDL is <100) need 30-50% reduction from baseline     BP Readings from Last 3 Encounters:   02/12/25 130/84   10/22/24 134/84   04/24/24 128/86    (goal /80)      All Future Testing planned in [facility]:  Lab Frequency Next Occurrence   Lipid Panel Once 10/22/2024   Albumin/Creatinine Ratio, Urine Once 10/22/2024   Comprehensive Metabolic Panel Once 10/22/2024   Hemoglobin A1C Once 10/22/2024       Next Visit Date:  Future Appointments   Date Time

## 2025-02-18 NOTE — TELEPHONE ENCOUNTER
Last OV: 2/12/2025 flu A 10/22/24 HTN, DM   Last RX:    Next scheduled apt: 4/22/2025   6 months           Pt requesting a refill     Pt started taking 20 mg on 07/06/22, notified pt

## 2025-02-26 ENCOUNTER — TELEPHONE (OUTPATIENT)
Dept: FAMILY MEDICINE CLINIC | Age: 35
End: 2025-02-26

## 2025-02-26 NOTE — TELEPHONE ENCOUNTER
Called [facility] Januvia need a PA.pharmacy will start a PA through [facility]. Looking for a fax.

## 2025-03-03 RX ORDER — SAXAGLIPTIN 5 MG/1
5 TABLET, FILM COATED ORAL DAILY
COMMUNITY
End: 2025-03-03 | Stop reason: SDUPTHER

## 2025-03-03 RX ORDER — SAXAGLIPTIN 5 MG/1
5 TABLET, FILM COATED ORAL DAILY
Qty: 30 TABLET | Refills: 2 | Status: SHIPPED | OUTPATIENT
Start: 2025-03-03

## 2025-03-03 NOTE — TELEPHONE ENCOUNTER
Jacoby,      Could we call pt and ask if pt picked up the medication or what the cost would be?? If he can't get this medication would they cover the farxiga or has he tried the metformin?      Dr Trini Humphrey       Spoke with patient regarding januvia being denied.  Onglyza is the preferred medication    Told patient that   would change his medication to Onglyza    [facility]

## 2025-04-30 ENCOUNTER — OFFICE VISIT (OUTPATIENT)
Dept: FAMILY MEDICINE CLINIC | Age: 35
End: 2025-04-30
Payer: COMMERCIAL

## 2025-04-30 VITALS
WEIGHT: 205 LBS | SYSTOLIC BLOOD PRESSURE: 138 MMHG | OXYGEN SATURATION: 98 % | HEART RATE: 99 BPM | DIASTOLIC BLOOD PRESSURE: 80 MMHG | BODY MASS INDEX: 32.18 KG/M2 | HEIGHT: 67 IN

## 2025-04-30 DIAGNOSIS — L03.312 CELLULITIS OF BACK: ICD-10-CM

## 2025-04-30 DIAGNOSIS — S20.419A: Primary | ICD-10-CM

## 2025-04-30 DIAGNOSIS — E11.69 TYPE 2 DIABETES MELLITUS WITH OTHER SPECIFIED COMPLICATION, WITHOUT LONG-TERM CURRENT USE OF INSULIN (HCC): ICD-10-CM

## 2025-04-30 PROCEDURE — 99214 OFFICE O/P EST MOD 30 MIN: CPT | Performed by: STUDENT IN AN ORGANIZED HEALTH CARE EDUCATION/TRAINING PROGRAM

## 2025-04-30 PROCEDURE — 3075F SYST BP GE 130 - 139MM HG: CPT | Performed by: STUDENT IN AN ORGANIZED HEALTH CARE EDUCATION/TRAINING PROGRAM

## 2025-04-30 PROCEDURE — 3079F DIAST BP 80-89 MM HG: CPT | Performed by: STUDENT IN AN ORGANIZED HEALTH CARE EDUCATION/TRAINING PROGRAM

## 2025-04-30 RX ORDER — GLIPIZIDE 5 MG/1
5 TABLET ORAL
Qty: 180 TABLET | Refills: 1 | Status: SHIPPED | OUTPATIENT
Start: 2025-04-30 | End: 2025-10-27

## 2025-04-30 ASSESSMENT — ENCOUNTER SYMPTOMS
COUGH: 0
ABDOMINAL PAIN: 0
BACK PAIN: 0
VOMITING: 0
WHEEZING: 0
NAUSEA: 0
SINUS PAIN: 0
SORE THROAT: 0
DIARRHEA: 0

## 2025-04-30 NOTE — PATIENT INSTRUCTIONS
SURVEY:    You may be receiving a [facility] from [facility] today.    You may get this in the mail, through your MyChart or in your email.     Please complete the survey to enable us to provide the highest quality of care to you and your family.    If you cannot score us as very good ( 5 Stars) on any question, please feel free to call the office to discuss how we could have made your experience exceptional.     Thank you.    Clinical Care Team:  Dr. Aden Glaser, DO Abby Virgen LPN    Triage:  Justina Huber CMA    Clerical Team:  Justina Duckworth

## 2025-04-30 NOTE — PROGRESS NOTES
HPI Notes    Name: Everett Damon  : 1990         Chief Complaint:     Chief Complaint   Patient presents with    Skin Problem     Patient has an area on his upper back that is red and swollen. Currently using antibiotic ointment to manage symptoms.       History of Present Illness:        HPI    This is a 34-year-old gentleman with type 2 diabetes presenting for a erythematous swollen area on his upper back.  He is currently putting an antibiotic ointment on the area with little improvement. He is concerned his cat may have scratched him. The area is a bit painful as well but has not bled or had any purulent discharge.     He also needs refill of his glipizide.     Past Medical History:     Past Medical History:   Diagnosis Date    ADHD (attention deficit hyperactivity disorder)     Head concussion     6 th grade (football)    Hypertension     Type 2 diabetes mellitus without complication (HCC)       Reviewed all health maintenance requirements and ordered appropriate tests  Health Maintenance Due   Topic Date Due    Varicella vaccine (1 of 2 - 13+ 2-dose series) Never done    HIV screen  Never done    Diabetic retinal exam  Never done    Hepatitis B vaccine (1 of 3 - 19+ 3-dose series) Never done    Pneumococcal 0-49 years Vaccine (1 of 2 - PCV) Never done    Diabetic Alb to Cr ratio (uACR) test  2023    Lipids  2024    GFR test (Diabetes, CKD 3-4, OR last GFR 15-59)  2024    COVID-19 Vaccine (2024- season) Never done    Diabetic foot exam  2025       Past Surgical History:     No past surgical history on file.     Medications:       Prior to Admission medications    Medication Sig Start Date End Date Taking? Authorizing Provider   glipiZIDE (GLUCOTROL) 5 MG tablet Take 1 tablet by mouth 2 times daily (before meals) 4/30/25 10/27/25 Yes Aden Glaser,    amoxicillin-clavulanate (AUGMENTIN) 875-125 MG per tablet Take 1 tablet by mouth 2 times daily for 7 days 25

## 2025-06-10 RX ORDER — SAXAGLIPTIN 5 MG/1
5 TABLET, FILM COATED ORAL DAILY
Qty: 30 TABLET | Refills: 2 | Status: SHIPPED | OUTPATIENT
Start: 2025-06-10

## 2025-06-10 NOTE — TELEPHONE ENCOUNTER
Last OV: 4/30/2025 DM, cellulitis   Last RX:    Next scheduled apt: Visit date not found          Surescript requesting a refill   Medication pending for approval

## 2025-07-12 ENCOUNTER — HOSPITAL ENCOUNTER (EMERGENCY)
Age: 35
Discharge: HOME | End: 2025-07-13
Attending: FAMILY MEDICINE
Payer: COMMERCIAL

## 2025-07-12 ENCOUNTER — APPOINTMENT (OUTPATIENT)
Dept: CT IMAGING | Age: 35
End: 2025-07-12
Payer: COMMERCIAL

## 2025-07-12 DIAGNOSIS — L02.811 ABSCESS OF SCALP: Primary | ICD-10-CM

## 2025-07-12 LAB
ALBUMIN SERPL-MCNC: 4.4 G/DL (ref 3.5–5.2)
ALBUMIN/GLOB SERPL: 1.3 {RATIO} (ref 1–2.5)
ALP SERPL-CCNC: 144 U/L (ref 40–129)
ALT SERPL-CCNC: 56 U/L (ref 5–41)
ANION GAP SERPL CALCULATED.3IONS-SCNC: 21 MMOL/L (ref 9–17)
AST SERPL-CCNC: 22 U/L
BASOPHILS # BLD: 0.04 K/UL (ref 0–0.2)
BASOPHILS NFR BLD: 0 % (ref 0–2)
BILIRUB SERPL-MCNC: 0.5 MG/DL (ref 0.3–1.2)
BUN SERPL-MCNC: 15 MG/DL (ref 6–20)
CALCIUM SERPL-MCNC: 10 MG/DL (ref 8.6–10.4)
CHLORIDE SERPL-SCNC: 94 MMOL/L (ref 98–107)
CO2 SERPL-SCNC: 17 MMOL/L (ref 20–31)
CREAT SERPL-MCNC: 0.9 MG/DL (ref 0.7–1.2)
EOSINOPHIL # BLD: 0.26 K/UL (ref 0–0.4)
EOSINOPHILS RELATIVE PERCENT: 3 % (ref 0–5)
ERYTHROCYTE [DISTWIDTH] IN BLOOD BY AUTOMATED COUNT: 12.7 % (ref 12.1–15.2)
GFR, ESTIMATED: >90 ML/MIN/1.73M2
GLUCOSE SERPL-MCNC: 334 MG/DL (ref 70–99)
HCT VFR BLD AUTO: 47.6 % (ref 41–53)
HGB BLD-MCNC: 16.9 G/DL (ref 13.5–17.5)
IMM GRANULOCYTES # BLD AUTO: 0.07 K/UL (ref 0–0.3)
IMM GRANULOCYTES NFR BLD: 1 % (ref 0–5)
LYMPHOCYTES NFR BLD: 1.81 K/UL (ref 1–4.8)
LYMPHOCYTES RELATIVE PERCENT: 18 % (ref 13–44)
MCH RBC QN AUTO: 28.7 PG (ref 26–34)
MCHC RBC AUTO-ENTMCNC: 35.5 G/DL (ref 31–37)
MCV RBC AUTO: 81 FL (ref 80–100)
MONOCYTES NFR BLD: 1.26 K/UL (ref 0–1)
MONOCYTES NFR BLD: 12 % (ref 5–9)
NEUTROPHILS NFR BLD: 66 % (ref 39–75)
NEUTS SEG NFR BLD: 6.92 K/UL (ref 2.1–6.5)
PLATELET # BLD AUTO: 190 K/UL (ref 140–450)
PMV BLD AUTO: 11.1 FL (ref 6–12)
POTASSIUM SERPL-SCNC: 4.3 MMOL/L (ref 3.7–5.3)
PROT SERPL-MCNC: 7.9 G/DL (ref 6.4–8.3)
RBC # BLD AUTO: 5.88 M/UL (ref 4.5–5.9)
SODIUM SERPL-SCNC: 132 MMOL/L (ref 135–144)
WBC OTHER # BLD: 10.4 K/UL (ref 3.5–11)

## 2025-07-12 PROCEDURE — 80053 COMPREHEN METABOLIC PANEL: CPT

## 2025-07-12 PROCEDURE — 85025 COMPLETE CBC W/AUTO DIFF WBC: CPT

## 2025-07-12 PROCEDURE — 70450 CT HEAD/BRAIN W/O DYE: CPT

## 2025-07-12 PROCEDURE — 99284 EMERGENCY DEPT VISIT MOD MDM: CPT

## 2025-07-12 ASSESSMENT — PAIN DESCRIPTION - DESCRIPTORS: DESCRIPTORS: SORE

## 2025-07-12 ASSESSMENT — PAIN DESCRIPTION - ORIENTATION: ORIENTATION: POSTERIOR

## 2025-07-12 ASSESSMENT — PAIN DESCRIPTION - LOCATION: LOCATION: HEAD

## 2025-07-12 ASSESSMENT — LIFESTYLE VARIABLES
HOW MANY STANDARD DRINKS CONTAINING ALCOHOL DO YOU HAVE ON A TYPICAL DAY: 1 OR 2
HOW OFTEN DO YOU HAVE A DRINK CONTAINING ALCOHOL: MONTHLY OR LESS

## 2025-07-12 ASSESSMENT — PAIN SCALES - GENERAL: PAINLEVEL_OUTOF10: 5

## 2025-07-12 ASSESSMENT — PAIN - FUNCTIONAL ASSESSMENT: PAIN_FUNCTIONAL_ASSESSMENT: ACTIVITIES ARE NOT PREVENTED

## 2025-07-12 ASSESSMENT — PAIN DESCRIPTION - PAIN TYPE: TYPE: ACUTE PAIN

## 2025-07-13 VITALS
SYSTOLIC BLOOD PRESSURE: 157 MMHG | RESPIRATION RATE: 18 BRPM | DIASTOLIC BLOOD PRESSURE: 86 MMHG | BODY MASS INDEX: 31.86 KG/M2 | HEIGHT: 67 IN | TEMPERATURE: 98.2 F | WEIGHT: 203 LBS | HEART RATE: 119 BPM | OXYGEN SATURATION: 96 %

## 2025-07-13 PROCEDURE — 2580000003 HC RX 258: Performed by: FAMILY MEDICINE

## 2025-07-13 PROCEDURE — 96375 TX/PRO/DX INJ NEW DRUG ADDON: CPT

## 2025-07-13 PROCEDURE — 6360000002 HC RX W HCPCS: Performed by: FAMILY MEDICINE

## 2025-07-13 PROCEDURE — 96365 THER/PROPH/DIAG IV INF INIT: CPT

## 2025-07-13 RX ORDER — KETOROLAC TROMETHAMINE 15 MG/ML
15 INJECTION, SOLUTION INTRAMUSCULAR; INTRAVENOUS ONCE
Status: COMPLETED | OUTPATIENT
Start: 2025-07-13 | End: 2025-07-13

## 2025-07-13 RX ORDER — 0.9 % SODIUM CHLORIDE 0.9 %
1000 INTRAVENOUS SOLUTION INTRAVENOUS ONCE
Status: COMPLETED | OUTPATIENT
Start: 2025-07-13 | End: 2025-07-13

## 2025-07-13 RX ADMIN — SODIUM CHLORIDE 1000 ML: 0.9 INJECTION, SOLUTION INTRAVENOUS at 01:19

## 2025-07-13 RX ADMIN — PIPERACILLIN AND TAZOBACTAM 3375 MG: 3; .375 INJECTION, POWDER, LYOPHILIZED, FOR SOLUTION INTRAVENOUS at 01:26

## 2025-07-13 RX ADMIN — KETOROLAC TROMETHAMINE 15 MG: 15 INJECTION, SOLUTION INTRAMUSCULAR; INTRAVENOUS at 01:19

## 2025-07-13 ASSESSMENT — PAIN DESCRIPTION - ORIENTATION: ORIENTATION: LEFT;POSTERIOR

## 2025-07-13 ASSESSMENT — PAIN DESCRIPTION - LOCATION: LOCATION: HEAD

## 2025-07-13 ASSESSMENT — PAIN - FUNCTIONAL ASSESSMENT: PAIN_FUNCTIONAL_ASSESSMENT: ACTIVITIES ARE NOT PREVENTED

## 2025-07-13 ASSESSMENT — PAIN DESCRIPTION - DESCRIPTORS: DESCRIPTORS: PINS AND NEEDLES

## 2025-07-13 ASSESSMENT — PAIN SCALES - GENERAL
PAINLEVEL_OUTOF10: 0
PAINLEVEL_OUTOF10: 7

## 2025-07-13 NOTE — ED NOTES
RN in to assess vitals. Pt BP still running hypertensive at 173/112 and HR low to high 120s. Pt reports that he feels very anxious when at the hospital and that his HR runs high \"when I go to the dentist too\". Pt has appeared very anxious since arrival and triage. MD made aware of pt abnormal vitals written above.

## 2025-07-13 NOTE — ED NOTES
Pt BP running high. States that he has a hx of high blood pressure and just took his blood pressure medication in the parking lot before coming in. 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

## 2025-07-13 NOTE — ED PROVIDER NOTES
eMERGENCY dEPARTMENT eNCOUnter        CHIEF COMPLAINT    Chief Complaint   Patient presents with    Abscess     Pt presents to ER with c/o having a hard, painful, red bump on the back of his head in which he states he presumes is an infected hair. States he had a haircut around 3 weeks ago and broke out in several ingrown hairs at the base of his head, but this lesion has gotten bigger and more painful the last few days.       HPI    Everett Damon is a 34 y.o. male who presents to ED with a large mass in his left occipital area which has been present for 2 days.  Patient has a history of an abscess on his back in the past year which responded to Augmentin.  Patient had Augmentin at home, and he has taken 2 doses of this recently.  Patient has a history of type 2 diabetes.    REVIEW OF SYSTEMS    All systems reviewed and positives are in the HPI      PAST MEDICAL HISTORY    Past Medical History:   Diagnosis Date    ADHD (attention deficit hyperactivity disorder)     Head concussion     6 th grade (football)    Hypertension     Type 2 diabetes mellitus without complication (HCC)        SURGICAL HISTORY    History reviewed. No pertinent surgical history.    CURRENT MEDICATIONS    Current Outpatient Rx   Medication Sig Dispense Refill    amoxicillin-clavulanate (AUGMENTIN) 875-125 MG per tablet Take 1 tablet by mouth 2 times daily for 10 days 20 tablet 0    sAXagliptin (ONGLYZA) 5 MG TABS tablet TAKE 1 TABLET BY MOUTH DAILY 30 tablet 2    glipiZIDE (GLUCOTROL) 5 MG tablet Take 1 tablet by mouth 2 times daily (before meals) 180 tablet 1    lisinopril (PRINIVIL;ZESTRIL) 20 MG tablet Take 1 tablet by mouth daily 90 tablet 1    Blood Glucose Monitoring Suppl (BLOOD GLUCOSE MONITOR SYSTEM) w/Device KIT Test sugar BID (Patient not taking: Reported on 9/26/2023) 1 kit 0    Glucose Blood (BLOOD GLUCOSE TEST STRIPS) STRP Test sugar BID (Patient not taking: Reported on 5/9/2023) 100 strip 1    Lancets MISC Test sugar bid